# Patient Record
Sex: MALE | Race: WHITE | NOT HISPANIC OR LATINO | Employment: OTHER | URBAN - METROPOLITAN AREA
[De-identification: names, ages, dates, MRNs, and addresses within clinical notes are randomized per-mention and may not be internally consistent; named-entity substitution may affect disease eponyms.]

---

## 2017-06-29 ENCOUNTER — TELEPHONE (OUTPATIENT)
Dept: DERMATOLOGY | Facility: CLINIC | Age: 72
End: 2017-06-29

## 2017-06-29 DIAGNOSIS — L71.9 ACNE ROSACEA: Primary | ICD-10-CM

## 2017-06-29 RX ORDER — MINOCYCLINE HYDROCHLORIDE 100 MG/1
CAPSULE ORAL
Qty: 60 CAPSULE | Refills: 1 | Status: SHIPPED | OUTPATIENT
Start: 2017-06-29 | End: 2017-11-06

## 2017-06-29 NOTE — TELEPHONE ENCOUNTER
Looks like he reported history of Rosacea at 9-27-18 Derm office visit, but I don't see that we have ordered Minocycline for him before. It is a patient reported medication per medication list review.     He was seen for follow up 12-20-16, and to return to clinic prn.    Please advise. (No pharmacy noted by Station )     Sofia Alberts RN

## 2017-06-29 NOTE — TELEPHONE ENCOUNTER
Order sent to pharmacy. Please notify patient.     Please have him f/u if rosacea is flaring despite the MCN

## 2017-06-29 NOTE — TELEPHONE ENCOUNTER
Patient notified of Minocycline order. Patient verbalized understanding. Advised if not improving, to schedule follow up Rosacea appointment and is due in Mid December for a recheck appointment if he wants to continue any medications we have prescribed for him. Sofia Alberts RN

## 2017-06-29 NOTE — TELEPHONE ENCOUNTER
Pt calling to get a refill on minocycline 50 MG tab.    Please advise -    Liseth Pitt  Clinic Station

## 2017-08-25 ENCOUNTER — TELEPHONE (OUTPATIENT)
Dept: DERMATOLOGY | Facility: CLINIC | Age: 72
End: 2017-08-25

## 2017-08-25 DIAGNOSIS — L57.0 AK (ACTINIC KERATOSIS): Primary | ICD-10-CM

## 2017-08-25 DIAGNOSIS — L71.9 ROSACEA: ICD-10-CM

## 2017-08-25 NOTE — TELEPHONE ENCOUNTER
Pt calling stating he would like Dr. mcclure to  Refill his medications:  Metronidazole gel .75%  Fluorouracil cream 5 %    First light in [place] pharmacy       Cyndy Armendariz  Specialty CSS

## 2017-08-28 RX ORDER — METRONIDAZOLE 10 MG/G
GEL TOPICAL DAILY
Qty: 60 G | Refills: 11 | Status: SHIPPED | OUTPATIENT
Start: 2017-08-28 | End: 2022-07-19

## 2017-08-28 RX ORDER — FLUOROURACIL 50 MG/G
CREAM TOPICAL
Qty: 40 G | Refills: 3 | Status: SHIPPED | OUTPATIENT
Start: 2017-08-28 | End: 2022-07-19

## 2017-09-06 ENCOUNTER — TELEPHONE (OUTPATIENT)
Dept: DERMATOLOGY | Facility: CLINIC | Age: 72
End: 2017-09-06

## 2017-09-06 NOTE — TELEPHONE ENCOUNTER
Spoke to patient who got his last rx's from a different Derm Provider. I did explain he is to use the Efudex cream twice daily for two weeks to treat any pre cancerous areas and then to be seen in 3 months for a recheck appt to be sure areas treated have resolved. Patient stated his prior Provider ordered Efudex twice a week. I advised Dr Tucker always orders it be used twice a day for 2 weeks.     Patient stated his prior Provider ordered Metrogel for him twice a day, I did advise Dr Tucker recommended once daily and if he uses it once daily and not resolving issue, to call us.     Patient has a follow up appt scheduled in December 2017.     Sofia Alberts RN

## 2017-09-06 NOTE — TELEPHONE ENCOUNTER
Reason for Call:  Patient is calling in to clarify new sigs on medications sent to pharmacy    Detailed comments: see above    Phone Number Patient can be reached at: Home number on file [phone] (home)    Best Time: any    Can we leave a detailed message on this number? YES    Call taken on 9/6/2017 at 1:06 PM by Natasha Ho

## 2017-11-06 DIAGNOSIS — L71.9 ACNE ROSACEA: ICD-10-CM

## 2017-11-07 RX ORDER — MINOCYCLINE HYDROCHLORIDE 100 MG/1
CAPSULE ORAL
Qty: 60 CAPSULE | Refills: 0 | Status: SHIPPED | OUTPATIENT
Start: 2017-11-07 | End: 2018-05-08

## 2017-12-12 ENCOUNTER — OFFICE VISIT (OUTPATIENT)
Dept: DERMATOLOGY | Facility: CLINIC | Age: 72
End: 2017-12-12
Payer: COMMERCIAL

## 2017-12-12 VITALS — SYSTOLIC BLOOD PRESSURE: 160 MMHG | HEART RATE: 46 BPM | DIASTOLIC BLOOD PRESSURE: 90 MMHG | OXYGEN SATURATION: 98 %

## 2017-12-12 DIAGNOSIS — Z87.2 HISTORY OF ACTINIC KERATOSES: Primary | ICD-10-CM

## 2017-12-12 DIAGNOSIS — L73.9 FOLLICULITIS: ICD-10-CM

## 2017-12-12 PROCEDURE — 99213 OFFICE O/P EST LOW 20 MIN: CPT | Performed by: DERMATOLOGY

## 2017-12-12 NOTE — PROGRESS NOTES
Jean Hernandez is a 72 year old year old male patient here today for f/u efudex has done very well.  No issues.  He notes that his neck is clear bu mcn upsets hi stomach.  Associated symptoms: none.  Patient has no other skin complaints today.  Remainder of the HPI, Meds, PMH, Allergies, FH, and SH was reviewed in chart.    History reviewed. No pertinent past medical history.    History reviewed. No pertinent surgical history.     Family History   Problem Relation Age of Onset     Melanoma No family hx of        Social History     Social History     Marital status:      Spouse name: N/A     Number of children: N/A     Years of education: N/A     Occupational History     Not on file.     Social History Main Topics     Smoking status: Never Smoker     Smokeless tobacco: Never Used     Alcohol use Not on file     Drug use: Not on file     Sexual activity: Not on file     Other Topics Concern     Not on file     Social History Narrative       Outpatient Encounter Prescriptions as of 12/12/2017   Medication Sig Dispense Refill     minocycline (MINOCIN/DYNACIN) 100 MG capsule TAKE 1 CAPSULE BY MOUTH TWICE DAILY 60 capsule 0     fluorouracil (EFUDEX) 5 % cream Apply twice daily for two weeks to face 40 g 3     brimonidine (ALPHAGAN) 0.2 % ophthalmic solution instill 1 drop IN EACH EYE TWICE DAILY  5     timolol (TIMOPTIC) 0.5 % ophthalmic solution instill 1 drop IN EACH EYE TWICE DAILY  5     latanoprost (XALATAN) 0.005 % ophthalmic solution instill 1 drop into IN EACH EYE EVERY NIGHT AT BEDTIME  5     metroNIDAZOLE (METROGEL) 1 % gel Apply topically daily (Patient not taking: Reported on 12/12/2017) 60 g 11     minocycline (MINOCIN,DYNACIN) 50 MG capsule TAKE 2 CAPSULES (100mg) BY MOUTH TWICE DAILY  1     fluocin-hydroquinone-tretinoin 0.01-4-0.05 % CREA Apply at bedtime (Patient not taking: Reported on 12/12/2017) 60 g 11     No facility-administered encounter medications on file as of 12/12/2017.              Review  Of Systems  Skin: As above  Eyes: negative  Ears/Nose/Throat: negative  Respiratory: No shortness of breath, dyspnea on exertion, cough, or hemoptysis  Cardiovascular: negative  Gastrointestinal: negative  Genitourinary: negative  Musculoskeletal: negative  Neurologic: negative  Psychiatric: negative  Hematologic/Lymphatic/Immunologic: negative  Endocrine: negative      O:   NAD, WDWN, Alert & Oriented, Mood & Affect wnl, Vitals stable   Here today alone   /90  Pulse (!) 46  SpO2 98%   General appearance normal   Vitals stable   Alert, oriented and in no acute distress     Face clear from actinic keratosis    No inflammatory papules onneck       The remainder of expanded problem focused exam was unremarkable; the following areas were examined:  scalp/hair, conjunctiva/lids, face, neck, lips, chest, digits/nails, RUE, LUE.      Eyes: Conjunctivae/lids:Normal     ENT: Lips, buccal mucosa, tongue: normal    MSK:Normal    Cardiovascular: peripheral edema none    Pulm: Breathing Normal    Neuro/Psych: Orientation:Normal; Mood/Affect:Normal      A/P:  1. Hx of actinic keratosis s/p efudex has done great!  2. Folliculitis clear  topiclas discussed with patient   Low dose doxy discussed with patient he declines  Cont topiclas prn  BENIGN LESIONS DISCUSSED WITH PATIENT:  I discussed the specifics of tumor, prognosis, and genetics of benign lesions.  I explained that treatment of these lesions would be purely cosmetic and not medically neccessary.  I discussed with patient different removal options including excision, cautery and /or laser.      Nature and genetics of benign skin lesions dicussed with patient.  Signs and Symptoms of skin cancer discussed with patient.  Patient encouraged to perform monthly skin exams.  UV precautions reviewed with patient.  Skin care regimen reviewed with patient: Eliminate harsh soaps, i.e. Dial, zest, irsih spring; Mild soaps such as Cetaphil or Dove sensitive skin, avoid hot or  cold showers, aggressive use of emollients including vanicream, cetaphil or cerave discussed with patient.    Return to clinic 6 months

## 2017-12-12 NOTE — LETTER
12/12/2017         RE: Jean Hernandez  [address] [address] [address]        Dear Colleague,    Thank you for referring your patient, Jean Hernandez, to the [facility]. Please see a copy of my visit note below.    Jean Hernandez is a 72 year old year old male patient here today for f/u efudex has done very well.  No issues.  He notes that his neck is clear bu mcn upsets hi stomach.  Associated symptoms: none.  Patient has no other skin complaints today.  Remainder of the HPI, Meds, PMH, Allergies, FH, and SH was reviewed in chart.    History reviewed. No pertinent past medical history.    History reviewed. No pertinent surgical history.     Family History   Problem Relation Age of Onset     Melanoma No family hx of        Social History     Social History     Marital status:      Spouse name: N/A     Number of children: N/A     Years of education: N/A     Occupational History     Not on file.     Social History Main Topics     Smoking status: Never Smoker     Smokeless tobacco: Never Used     Alcohol use Not on file     Drug use: Not on file     Sexual activity: Not on file     Other Topics Concern     Not on file     Social History Narrative       Outpatient Encounter Prescriptions as of 12/12/2017   Medication Sig Dispense Refill     minocycline (MINOCIN/DYNACIN) 100 MG capsule TAKE 1 CAPSULE BY MOUTH TWICE DAILY 60 capsule 0     fluorouracil (EFUDEX) 5 % cream Apply twice daily for two weeks to face 40 g 3     brimonidine (ALPHAGAN) 0.2 % ophthalmic solution instill 1 drop IN EACH EYE TWICE DAILY  5     timolol (TIMOPTIC) 0.5 % ophthalmic solution instill 1 drop IN EACH EYE TWICE DAILY  5     latanoprost (XALATAN) 0.005 % ophthalmic solution instill 1 drop into IN EACH EYE EVERY NIGHT AT BEDTIME  5     metroNIDAZOLE (METROGEL) 1 % gel Apply topically daily (Patient not taking: Reported on 12/12/2017) 60 g 11     minocycline (MINOCIN,DYNACIN) 50 MG capsule TAKE 2 CAPSULES (100mg) BY MOUTH  TWICE DAILY  1     fluocin-hydroquinone-tretinoin 0.01-4-0.05 % CREA Apply at bedtime (Patient not taking: Reported on 12/12/2017) 60 g 11     No facility-administered encounter medications on file as of 12/12/2017.              Review Of Systems  Skin: As above  Eyes: negative  Ears/Nose/Throat: negative  Respiratory: No shortness of breath, dyspnea on exertion, cough, or hemoptysis  Cardiovascular: negative  Gastrointestinal: negative  Genitourinary: negative  Musculoskeletal: negative  Neurologic: negative  Psychiatric: negative  Hematologic/Lymphatic/Immunologic: negative  Endocrine: negative      O:   NAD, WDWN, Alert & Oriented, Mood & Affect wnl, Vitals stable   Here today alone   /90  Pulse (!) 46  SpO2 98%   General appearance normal   Vitals stable   Alert, oriented and in no acute distress     Face clear from actinic keratosis    No inflammatory papules onneck       The remainder of expanded problem focused exam was unremarkable; the following areas were examined:  scalp/hair, conjunctiva/lids, face, neck, lips, chest, digits/nails, RUE, LUE.      Eyes: Conjunctivae/lids:Normal     ENT: Lips, buccal mucosa, tongue: normal    MSK:Normal    Cardiovascular: peripheral edema none    Pulm: Breathing Normal    Neuro/Psych: Orientation:Normal; Mood/Affect:Normal      A/P:  1. Hx of actinic keratosis s/p efudex has done great!  2. Folliculitis clear  topiclas discussed with patient   Low dose doxy discussed with patient he declines  Cont topiclas prn  BENIGN LESIONS DISCUSSED WITH PATIENT:  I discussed the specifics of tumor, prognosis, and genetics of benign lesions.  I explained that treatment of these lesions would be purely cosmetic and not medically neccessary.  I discussed with patient different removal options including excision, cautery and /or laser.      Nature and genetics of benign skin lesions dicussed with patient.  Signs and Symptoms of skin cancer discussed with patient.  Patient  encouraged to perform monthly skin exams.  UV precautions reviewed with patient.  Skin care regimen reviewed with patient: Eliminate harsh soaps, i.e. Dial, zest, irsih spring; Mild soaps such as Cetaphil or Dove sensitive skin, avoid hot or cold showers, aggressive use of emollients including vanicream, cetaphil or cerave discussed with patient.    Return to clinic 6 months      Again, thank you for allowing me to participate in the care of your patient.        Sincerely,        Balta Tucker MD

## 2017-12-12 NOTE — NURSING NOTE
Chief Complaint   Patient presents with     Derm Problem     med fu       Vitals:    12/12/17 1005   BP: 160/90   Pulse: (!) 46   SpO2: 98%     Wt Readings from Last 1 Encounters:   No data found for Wt   Heydi Pina LPN.................12/12/2017

## 2017-12-12 NOTE — MR AVS SNAPSHOT
"              After Visit Summary   12/12/2017    Jean Hernandez    MRN: [id number]           Patient Information     Date Of Birth          1945        Visit Information        Provider Department      12/12/2017 10:00 AM Balta Tucker MD [facility]        Today's Diagnoses     History of actinic keratoses    -  1    Folliculitis           Follow-ups after your visit        Your next 10 appointments already scheduled     Jun 12, 2018 10:00 AM CDT   [facility] with Balta Tucker MD   [facility]    [address] [place] [address]   [phone]              Who to contact     If you have questions or need follow up information about today's clinic visit or your schedule please contact [facility] directly at [phone].  Normal or non-critical lab and imaging results will be communicated to you by MyChart, letter or phone within 4 business days after the clinic has received the results. If you do not hear from us within 7 days, please contact the clinic through MyChart or phone. If you have a critical or abnormal lab result, we will notify you by phone as soon as possible.  Submit refill requests through [email or web address] or call your pharmacy and they will forward the refill request to us. Please allow 3 business days for your refill to be completed.          Additional Information About Your Visit        MyChart Information     [email or web address] lets you send messages to your doctor, view your test results, renew your prescriptions, schedule appointments and more. To sign up, go to [email or web address] . Click on \"Log in\" on the left side of the screen, which will take you to the Welcome page. Then click on \"Sign up Now\" on the right side of the page.     You will be asked to enter the access code listed below, as well as some personal information. Please follow the directions to create your username and password.   "   Your access code is: [email or web address]  Expires: 3/12/2018 10:20 AM     Your access code will  in 90 days. If you need help or a new code, please call your [place] clinic or [phone].        Care EveryWhere ID     This is your Care EveryWhere ID. This could be used by other organizations to access your [place] medical records  [id number]        Your Vitals Were     Pulse Pulse Oximetry                46 98%           Blood Pressure from Last 3 Encounters:   17 160/90   16 157/89   16 (!) 165/91    Weight from Last 3 Encounters:   No data found for Wt              Today, you had the following     No orders found for display       Primary Care Provider Fax #    Physician No Ref-Primary [phone]       No address on file        Equal Access to Services     [facility] : Hadii camron Randall, waaxda luqadaha, qaybta kaalmada adeyoniyastacey, ras pyaton . So [facility] [phone].    ATENCIÓN: Si habla español, tiene a deleon disposición servicios gratuitos de asistencia lingüística. Gustavo al [phone].    We comply with applicable federal civil rights laws and [place] laws. We do not discriminate on the basis of race, color, national origin, age, disability, sex, sexual orientation, or gender identity.            Thank you!     Thank you for choosing [facility]  for your care. Our goal is always to provide you with excellent care. Hearing back from our patients is one way we can continue to improve our services. Please take a few minutes to complete the written survey that you may receive in the mail after your visit with us. Thank you!             Your Updated Medication List - Protect others around you: Learn how to safely use, store and throw away your medicines at www.disposemymeds.org.          This list is accurate as of: 17 10:20 AM.  Always use your most recent med list.                   Brand Name Dispense Instructions for use  Diagnosis    brimonidine 0.2 % ophthalmic solution    ALPHAGAN     instill 1 drop IN EACH EYE TWICE DAILY        fluocin-hydroquinone-tretinoin 0.01-4-0.05 % Crea     60 g    Apply at bedtime    Melasma       fluorouracil 5 % cream    EFUDEX    40 g    Apply twice daily for two weeks to face    AK (actinic keratosis), Rosacea       latanoprost 0.005 % ophthalmic solution    XALATAN     instill 1 drop into IN EACH EYE EVERY NIGHT AT BEDTIME        metroNIDAZOLE 1 % gel    METROGEL    60 g    Apply topically daily    AK (actinic keratosis), Rosacea       * minocycline 50 MG capsule    MINOCIN/DYNACIN     TAKE 2 CAPSULES (100mg) BY MOUTH TWICE DAILY        * minocycline 100 MG capsule    MINOCIN/DYNACIN    60 capsule    TAKE 1 CAPSULE BY MOUTH TWICE DAILY    Acne rosacea       timolol 0.5 % ophthalmic solution    TIMOPTIC     instill 1 drop IN EACH EYE TWICE DAILY        * Notice:  This list has 2 medication(s) that are the same as other medications prescribed for you. Read the directions carefully, and ask your doctor or other care provider to review them with you.

## 2018-01-31 DIAGNOSIS — L71.9 ROSACEA: Primary | ICD-10-CM

## 2018-01-31 RX ORDER — MINOCYCLINE HYDROCHLORIDE 50 MG/1
CAPSULE ORAL
Qty: 60 CAPSULE | Refills: 1 | Status: SHIPPED | OUTPATIENT
Start: 2018-01-31 | End: 2018-04-04

## 2018-01-31 NOTE — TELEPHONE ENCOUNTER
minocycline (MINOCIN/DYNACIN) 100 MG capsule    Date Last Filled: 11/07/2017  QTY: 60    [facility] [place]

## 2018-01-31 NOTE — TELEPHONE ENCOUNTER
Spoke to pt and he stated that it was discussed at his last office visit that the Minocycline would be decreased to 50 mg instead of the 100 mg. Unable to find documentation or dose ordered. Please review and order 50 mg minocycline for pt if appropriate. [facility] pharmacy in [place].  Ileana BEE RN BSN PHN  Specialty Clinics

## 2018-04-04 DIAGNOSIS — L71.9 ROSACEA: ICD-10-CM

## 2018-04-04 RX ORDER — MINOCYCLINE HYDROCHLORIDE 50 MG/1
CAPSULE ORAL
Qty: 60 CAPSULE | Refills: 1 | Status: SHIPPED | OUTPATIENT
Start: 2018-04-04 | End: 2018-05-04

## 2018-04-04 NOTE — TELEPHONE ENCOUNTER
Do you want patient to go on this Rx? Patient declined during last office visit.  JOSE ALEJANDRO Amador-BSN  [facility]  [phone]

## 2018-05-04 DIAGNOSIS — L71.9 ROSACEA: ICD-10-CM

## 2018-05-04 NOTE — TELEPHONE ENCOUNTER
minocycline (MINOCIN/DYNACIN) 50 MG capsule    Date Last Filled: 04/18/18  QTY: 120    [facility] [place]

## 2018-05-08 RX ORDER — MINOCYCLINE HYDROCHLORIDE 50 MG/1
CAPSULE ORAL
Qty: 60 CAPSULE | Refills: 0 | Status: SHIPPED | OUTPATIENT
Start: 2018-05-08 | End: 2018-09-17

## 2018-06-12 ENCOUNTER — OFFICE VISIT (OUTPATIENT)
Dept: DERMATOLOGY | Facility: CLINIC | Age: 73
End: 2018-06-12
Payer: COMMERCIAL

## 2018-06-12 VITALS — SYSTOLIC BLOOD PRESSURE: 156 MMHG | DIASTOLIC BLOOD PRESSURE: 89 MMHG | TEMPERATURE: 97 F | HEART RATE: 56 BPM

## 2018-06-12 DIAGNOSIS — L82.1 SK (SEBORRHEIC KERATOSIS): ICD-10-CM

## 2018-06-12 DIAGNOSIS — L81.4 LENTIGO: Primary | ICD-10-CM

## 2018-06-12 PROCEDURE — 99213 OFFICE O/P EST LOW 20 MIN: CPT | Performed by: DERMATOLOGY

## 2018-06-12 NOTE — LETTER
6/12/2018         RE: Jean Hernandez  [address] [address] [address]        Dear Colleague,    Thank you for referring your patient, Jean Hernandez, to the [facility]. Please see a copy of my visit note below.    Jean Hernandez is a 72 year old year old male patient here today for hx of actinic keratosis and folliculitis.  All clear,  Stopped MCN,  No issues.  Has used efudex in the past.  He notes one brown spot on right arm.   .  Patient states this has been present for a whi;le.  Patient reports the following symptoms:  none .  Patient reports the following previous treatments none.  Patient reports the following modifying factors none.  Associated symptoms: none.  Patient has no other skin complaints today.  Remainder of the HPI, Meds, PMH, Allergies, FH, and SH was reviewed in chart.    History reviewed. No pertinent past medical history.    History reviewed. No pertinent surgical history.     Family History   Problem Relation Age of Onset     Melanoma No family hx of        Social History     Social History     Marital status:      Spouse name: N/A     Number of children: N/A     Years of education: N/A     Occupational History     Not on file.     Social History Main Topics     Smoking status: Never Smoker     Smokeless tobacco: Never Used     Alcohol use Not on file     Drug use: Not on file     Sexual activity: Not on file     Other Topics Concern     Not on file     Social History Narrative       Outpatient Encounter Prescriptions as of 6/12/2018   Medication Sig Dispense Refill     brimonidine (ALPHAGAN) 0.2 % ophthalmic solution instill 1 drop IN EACH EYE TWICE DAILY  5     fluocin-hydroquinone-tretinoin 0.01-4-0.05 % CREA Apply at bedtime (Patient not taking: Reported on 12/12/2017) 60 g 11     fluorouracil (EFUDEX) 5 % cream Apply twice daily for two weeks to face (Patient not taking: Reported on 6/12/2018) 40 g 3     latanoprost (XALATAN) 0.005 % ophthalmic solution instill 1  drop into IN EACH EYE EVERY NIGHT AT BEDTIME  5     metroNIDAZOLE (METROGEL) 1 % gel Apply topically daily (Patient not taking: Reported on 12/12/2017) 60 g 11     minocycline (MINOCIN/DYNACIN) 50 MG capsule TAKE 2 CAPSULES (100mg) BY MOUTH TWICE DAILY 60 capsule 0     timolol (TIMOPTIC) 0.5 % ophthalmic solution instill 1 drop IN EACH EYE TWICE DAILY  5     No facility-administered encounter medications on file as of 6/12/2018.              Review Of Systems  Skin: As above  Eyes: negative  Ears/Nose/Throat: negative  Respiratory: No shortness of breath, dyspnea on exertion, cough, or hemoptysis  Cardiovascular: negative  Gastrointestinal: negative  Genitourinary: negative  Musculoskeletal: negative  Neurologic: negative  Psychiatric: negative  Hematologic/Lymphatic/Immunologic: negative  Endocrine: negative      O:   NAD, WDWN, Alert & Oriented, Mood & Affect wnl, Vitals stable   Here today alone   /89  Pulse 56  Temp 97  F (36.1  C) (Tympanic)   General appearance normal   Vitals stable   Alert, oriented and in no acute distress      Following lymph nodes palpated: Occipital, Cervical, Supraclavicular no lad   Stuck on papules and brown macules on trunk and ext    R arm stuck on appule         The remainder of expanded problem focused exam was unremarkable; the following areas were examined:  scalp/hair, conjunctiva/lids, face, neck, lips, chest, digits/nails, RUE, LUE.      Eyes: Conjunctivae/lids:Normal     ENT: Lips, buccal mucosa, tongue: normal    MSK:Normal    Cardiovascular: peripheral edema none    Pulm: Breathing Normal    Lymph Nodes: No Head and Neck Lymphadenopathy     Neuro/Psych: Orientation:Normal; Mood/Affect:Normal      A/P:  1. Folliculitis clear  2. Actinic keratosis clear  3. Seborrheic keratosis, letnigo  Stop mcn  BENIGN LESIONS DISCUSSED WITH PATIENT:  I discussed the specifics of tumor, prognosis, and genetics of benign lesions.  I explained that treatment of these lesions would be  purely cosmetic and not medically neccessary.  I discussed with patient different removal options including excision, cautery and /or laser.      Nature and genetics of benign skin lesions dicussed with patient.  Signs and Symptoms of skin cancer discussed with patient.  Patient encouraged to perform monthly skin exams.  UV precautions reviewed with patient.  Patient to follow up with Primary Care provider regarding elevated blood pressure.  Skin care regimen reviewed with patient: Eliminate harsh soaps, i.e. Dial, zest, irsih spring; Mild soaps such as Cetaphil or Dove sensitive skin, avoid hot or cold showers, aggressive use of emollients including vanicream, cetaphil or cerave discussed with patient.    Risks of non-melanoma skin cancer discussed with patient   Return to clinic 12 months      Again, thank you for allowing me to participate in the care of your patient.        Sincerely,        Balta Tucker MD

## 2018-06-12 NOTE — PROGRESS NOTES
Jean Hernandez is a 72 year old year old male patient here today for hx of actinic keratosis and folliculitis.  All clear,  Stopped MCN,  No issues.  Has used efudex in the past.  He notes one brown spot on right arm.   .  Patient states this has been present for a whi;le.  Patient reports the following symptoms:  none .  Patient reports the following previous treatments none.  Patient reports the following modifying factors none.  Associated symptoms: none.  Patient has no other skin complaints today.  Remainder of the HPI, Meds, PMH, Allergies, FH, and SH was reviewed in chart.    History reviewed. No pertinent past medical history.    History reviewed. No pertinent surgical history.     Family History   Problem Relation Age of Onset     Melanoma No family hx of        Social History     Social History     Marital status:      Spouse name: N/A     Number of children: N/A     Years of education: N/A     Occupational History     Not on file.     Social History Main Topics     Smoking status: Never Smoker     Smokeless tobacco: Never Used     Alcohol use Not on file     Drug use: Not on file     Sexual activity: Not on file     Other Topics Concern     Not on file     Social History Narrative       Outpatient Encounter Prescriptions as of 6/12/2018   Medication Sig Dispense Refill     brimonidine (ALPHAGAN) 0.2 % ophthalmic solution instill 1 drop IN EACH EYE TWICE DAILY  5     fluocin-hydroquinone-tretinoin 0.01-4-0.05 % CREA Apply at bedtime (Patient not taking: Reported on 12/12/2017) 60 g 11     fluorouracil (EFUDEX) 5 % cream Apply twice daily for two weeks to face (Patient not taking: Reported on 6/12/2018) 40 g 3     latanoprost (XALATAN) 0.005 % ophthalmic solution instill 1 drop into IN EACH EYE EVERY NIGHT AT BEDTIME  5     metroNIDAZOLE (METROGEL) 1 % gel Apply topically daily (Patient not taking: Reported on 12/12/2017) 60 g 11     minocycline (MINOCIN/DYNACIN) 50 MG capsule TAKE 2 CAPSULES (100mg) BY  MOUTH TWICE DAILY 60 capsule 0     timolol (TIMOPTIC) 0.5 % ophthalmic solution instill 1 drop IN EACH EYE TWICE DAILY  5     No facility-administered encounter medications on file as of 6/12/2018.              Review Of Systems  Skin: As above  Eyes: negative  Ears/Nose/Throat: negative  Respiratory: No shortness of breath, dyspnea on exertion, cough, or hemoptysis  Cardiovascular: negative  Gastrointestinal: negative  Genitourinary: negative  Musculoskeletal: negative  Neurologic: negative  Psychiatric: negative  Hematologic/Lymphatic/Immunologic: negative  Endocrine: negative      O:   NAD, WDWN, Alert & Oriented, Mood & Affect wnl, Vitals stable   Here today alone   /89  Pulse 56  Temp 97  F (36.1  C) (Tympanic)   General appearance normal   Vitals stable   Alert, oriented and in no acute distress      Following lymph nodes palpated: Occipital, Cervical, Supraclavicular no lad   Stuck on papules and brown macules on trunk and ext    R arm stuck on appule         The remainder of expanded problem focused exam was unremarkable; the following areas were examined:  scalp/hair, conjunctiva/lids, face, neck, lips, chest, digits/nails, RUE, LUE.      Eyes: Conjunctivae/lids:Normal     ENT: Lips, buccal mucosa, tongue: normal    MSK:Normal    Cardiovascular: peripheral edema none    Pulm: Breathing Normal    Lymph Nodes: No Head and Neck Lymphadenopathy     Neuro/Psych: Orientation:Normal; Mood/Affect:Normal      A/P:  1. Folliculitis clear  2. Actinic keratosis clear  3. Seborrheic keratosis, letnigo  Stop mcn  BENIGN LESIONS DISCUSSED WITH PATIENT:  I discussed the specifics of tumor, prognosis, and genetics of benign lesions.  I explained that treatment of these lesions would be purely cosmetic and not medically neccessary.  I discussed with patient different removal options including excision, cautery and /or laser.      Nature and genetics of benign skin lesions dicussed with patient.  Signs and Symptoms of  skin cancer discussed with patient.  Patient encouraged to perform monthly skin exams.  UV precautions reviewed with patient.  Patient to follow up with Primary Care provider regarding elevated blood pressure.  Skin care regimen reviewed with patient: Eliminate harsh soaps, i.e. Dial, zest, irsih spring; Mild soaps such as Cetaphil or Dove sensitive skin, avoid hot or cold showers, aggressive use of emollients including vanicream, cetaphil or cerave discussed with patient.    Risks of non-melanoma skin cancer discussed with patient   Return to clinic 12 months

## 2018-06-12 NOTE — NURSING NOTE
Initial /89  Pulse 56  Temp 97  F (36.1  C) (Tympanic) There is no height or weight on file to calculate BMI. .    Venus Diop LPN

## 2018-06-12 NOTE — MR AVS SNAPSHOT
After Visit Summary   6/12/2018    Jean Hernandez    MRN: [id number]           Patient Information     Date Of Birth          1945        Visit Information        Provider Department      6/12/2018 10:00 AM Balta Tucker MD [facility]        Today's Diagnoses     Lentigo    -  1    SK (seborrheic keratosis)           Follow-ups after your visit        Who to contact     If you have questions or need follow up information about today's clinic visit or your schedule please contact [facility] directly at [phone].  Normal or non-critical lab and imaging results will be communicated to you by MyChart, letter or phone within 4 business days after the clinic has received the results. If you do not hear from us within 7 days, please contact the clinic through MyChart or phone. If you have a critical or abnormal lab result, we will notify you by phone as soon as possible.  Submit refill requests through [employer] or call your pharmacy and they will forward the refill request to us. Please allow 3 business days for your refill to be completed.          Additional Information About Your Visit        Care EveryWhere ID     This is your Care EveryWhere ID. This could be used by other organizations to access your [place] medical records  [id number]        Your Vitals Were     Pulse Temperature                56 97  F (36.1  C) (Tympanic)           Blood Pressure from Last 3 Encounters:   06/12/18 156/89   12/12/17 160/90   12/20/16 157/89    Weight from Last 3 Encounters:   No data found for Wt              Today, you had the following     No orders found for display       Primary Care Provider Fax #    Physician No Ref-Primary [phone]       No address on file        Equal Access to Services     JUNIOR LEÓN : Evan Randall, alessandro flores, ras george . So [facility] [phone].    ATENCIÓN: Si  bryant vera, tiene a deleon disposición servicios gratuitos de asistencia lingüística. Gustavo tate [phone].    We comply with applicable federal civil rights laws and [place] laws. We do not discriminate on the basis of race, color, national origin, age, disability, sex, sexual orientation, or gender identity.            Thank you!     Thank you for choosing [facility]  for your care. Our goal is always to provide you with excellent care. Hearing back from our patients is one way we can continue to improve our services. Please take a few minutes to complete the written survey that you may receive in the mail after your visit with us. Thank you!             Your Updated Medication List - Protect others around you: Learn how to safely use, store and throw away your medicines at www.disposemymeds.org.          This list is accurate as of 6/12/18 10:15 AM.  Always use your most recent med list.                   Brand Name Dispense Instructions for use Diagnosis    brimonidine 0.2 % ophthalmic solution    ALPHAGAN     instill 1 drop IN EACH EYE TWICE DAILY        fluocin-hydroquinone-tretinoin 0.01-4-0.05 % Crea     60 g    Apply at bedtime    Melasma       fluorouracil 5 % cream    EFUDEX    40 g    Apply twice daily for two weeks to face    AK (actinic keratosis), Rosacea       latanoprost 0.005 % ophthalmic solution    XALATAN     instill 1 drop into IN EACH EYE EVERY NIGHT AT BEDTIME        metroNIDAZOLE 1 % gel    METROGEL    60 g    Apply topically daily    AK (actinic keratosis), Rosacea       minocycline 50 MG capsule    MINOCIN/DYNACIN    60 capsule    TAKE 2 CAPSULES (100mg) BY MOUTH TWICE DAILY    Rosacea       timolol 0.5 % ophthalmic solution    TIMOPTIC     instill 1 drop IN EACH EYE TWICE DAILY

## 2018-09-17 DIAGNOSIS — L71.9 ROSACEA: ICD-10-CM

## 2018-09-17 NOTE — TELEPHONE ENCOUNTER
Reason for Call:  Medication or medication refill:    Do you use a [facility]?  Name of the pharmacy and phone number for the current request:  [facility]    Name of the medication requested: Minocycline    Other request: Pt is requesting 100 mg strength (pt had to take 4 a day at the 50 mg dose, and found that too hard to do)    Can we leave a detailed message on this number? YES    Phone number patient can be reached at: Home number on file [phone] (home)    Best Time: Any    Call taken on 9/17/2018 at 10:09 AM by Denise Behrendt

## 2018-09-18 RX ORDER — MINOCYCLINE HYDROCHLORIDE 100 MG/1
100 CAPSULE ORAL 2 TIMES DAILY WITH MEALS
Qty: 28 CAPSULE | Refills: 3 | Status: SHIPPED | OUTPATIENT
Start: 2018-09-18 | End: 2018-12-06

## 2018-09-18 NOTE — TELEPHONE ENCOUNTER
"Spoke to patient who stated:     \"I had stopped the Minocycline like he said but now my skin is getting bumpy again, so I would like to be able to take it if I need it, but it is too hard to take 4 pills a day...\"     Please advise. I cued sig with new directions- please check if appropriate before signing... Sofia Alberts RN    "

## 2018-12-06 DIAGNOSIS — L71.9 ROSACEA: ICD-10-CM

## 2018-12-06 RX ORDER — MINOCYCLINE HYDROCHLORIDE 100 MG/1
100 CAPSULE ORAL 2 TIMES DAILY WITH MEALS
Qty: 60 CAPSULE | Refills: 4 | Status: SHIPPED | OUTPATIENT
Start: 2018-12-06 | End: 2019-09-30

## 2018-12-06 NOTE — TELEPHONE ENCOUNTER
Reason for Call:  Medication or medication refill:    Do you use a [facility]?  Name of the pharmacy and phone number for the current request:  [facility]    Name of the medication requested: minocycline    Other request: LOV:  6/12/18  LAST REFILL:  11/19/18    Can we leave a detailed message on this number? YES    Phone number patient can be reached at: Home number on file [phone] (home)    Best Time: any     Call taken on 12/6/2018 at 10:58 AM by Cyndy Armendariz

## 2018-12-06 NOTE — TELEPHONE ENCOUNTER
Patient has follow-up appointment scheduled with provider  medication has been re-ordered per last encounter.   Will refill per [facility] protocol.     Karmen MADERA RN   [facility]

## 2018-12-07 ENCOUNTER — TELEPHONE (OUTPATIENT)
Dept: DERMATOLOGY | Facility: CLINIC | Age: 73
End: 2018-12-07

## 2018-12-07 NOTE — TELEPHONE ENCOUNTER
Spoke to pt and he stated that he had a bump on his ear and he used efudex on this bump in which the scab has fallen off and now is a smaller sore. He stated that he also has a scabby thing on his head. Advised pt that he should have an appointment to look at the spots before putting anymore medications on them.  Pt agreed with plan and appt was made.  Ileana BEE RN BSN PHN  Specialty Clinics

## 2018-12-07 NOTE — TELEPHONE ENCOUNTER
Reason for Call:  Other prescription    Detailed comments: pt calling stating he has bump on his ear that he has been using metroNIDAZOLE gel. The gel is  and also has a efudex that is . He would like to know if he should get a refill on these to have?    Phone Number Patient can be reached at: Home number on file [phone] (home)    Best Time: any     Can we leave a detailed message on this number? YES    Call taken on 2018 at 3:22 PM by Cyndy Armendariz

## 2018-12-11 ENCOUNTER — OFFICE VISIT (OUTPATIENT)
Dept: DERMATOLOGY | Facility: CLINIC | Age: 73
End: 2018-12-11
Payer: COMMERCIAL

## 2018-12-11 VITALS — SYSTOLIC BLOOD PRESSURE: 157 MMHG | HEART RATE: 57 BPM | DIASTOLIC BLOOD PRESSURE: 91 MMHG | HEIGHT: 74 IN

## 2018-12-11 DIAGNOSIS — L82.1 SEBORRHEIC KERATOSIS: ICD-10-CM

## 2018-12-11 DIAGNOSIS — L57.0 AK (ACTINIC KERATOSIS): Primary | ICD-10-CM

## 2018-12-11 DIAGNOSIS — L81.4 LENTIGO: ICD-10-CM

## 2018-12-11 PROCEDURE — 17000 DESTRUCT PREMALG LESION: CPT | Performed by: DERMATOLOGY

## 2018-12-11 PROCEDURE — 99213 OFFICE O/P EST LOW 20 MIN: CPT | Mod: 25 | Performed by: DERMATOLOGY

## 2018-12-11 NOTE — PATIENT INSTRUCTIONS
WOUND CARE INSTRUCTIONS   FOR CRYOSURGERY     Right ear  This area treated with liquid nitrogen should form a blister (areas treated may or may not blister-skin may just turn dark and slough off). You do not need to bandage the area unless a blister forms and breaks (which may be a few days). When the blister breaks, begin daily dressing changes as follows:  1) Clean and dry the area with tap water using clean Q-tip or sterile gauze pad.   2) Apply Polysporin ointment or Bacitracin ointment over entire wound. Do NOT use Neosporin ointment.   3) Cover the wound with a band-aid or sterile non-stick gauze pad and micropore paper tape.   REPEAT THESE INSTRUCTIONS AT LEAST ONCE A DAY UNTIL THE WOUND HAS COMPLETELY HEALED.   It is an old wives tale that a wound heals better when it is exposed to air and allowed to dry out. The wound will heal faster with a better cosmetic result if it is kept moist with ointment and covered with a bandage.   Do not let the wound dry out.   IMPORTANT INFORMATION ON REVERSE SIDE   Supplies Needed:   *Cotton tipped applicators (Q-tips)   *Polysporin ointment or Bacitracin ointment (NOT NEOSPORIN)   *Band-aids, or non stick gauze pads and micropore paper tape   PATIENT INFORMATION   During the healing process you will notice a number of changes. All wounds develop a small halo of redness surrounding the wound. This means healing is occurring. Severe itching with extensive redness usually indicates sensitivity to the ointment or bandage tape used to dress the wound. You should call our office if this develops.   Swelling and/or discoloration around your surgical site is common, particularly when performed around the eye.   All wounds normally drain. The larger the wound the more drainage there will be. After 7-10 days, you will notice the wound beginning to shrink and new skin will begin to grow. The wound is healed when you can see skin has formed over the entire area. A healed wound has a  healthy, shiny look to the surface and is red to dark pink in color to normalize. Wounds may take approximately 4-6 weeks to heal. Larger wounds may take 6-8 weeks. After the wound is healed you may discontinue dressing changes.   You may experience a sensation of tightness as your wound heals. This is normal and will gradually subside.   Your healed wound may be sensitive to temperature changes. This sensitivity improves with time, but if you re having a lot of discomfort, try to avoid temperature extremes.   Patients frequently experience itching after their wound appears to have healed because of the continue healing under the skin. Plain Vaseline will help relieve the itching.

## 2018-12-11 NOTE — LETTER
12/11/2018         RE: Jean Hernandez  [address] [address] [facility] [address]        Dear Colleague,    Thank you for referring your patient, Jean Hernandez, to the [facility]. Please see a copy of my visit note below.    Jean Hernandez is a 73 year old year old male patient here today for rough spot on scalp and right ear.   .  Patient states this has been present for a while.  Patient reports the following symptoms:  rough.  Patient reports the following previous treatments none.  Patient reports the following modifying factors none.  Associated symptoms: none.  Patient has no other skin complaints today.  Remainder of the HPI, Meds, PMH, Allergies, FH, and SH was reviewed in chart.      History reviewed. No pertinent past medical history.    History reviewed. No pertinent surgical history.     Family History   Problem Relation Age of Onset     Melanoma No family hx of        Social History     Socioeconomic History     Marital status:      Spouse name: Not on file     Number of children: Not on file     Years of education: Not on file     Highest education level: Not on file   Social Needs     Financial resource strain: Not on file     Food insecurity - worry: Not on file     Food insecurity - inability: Not on file     Transportation needs - medical: Not on file     Transportation needs - non-medical: Not on file   Occupational History     Not on file   Tobacco Use     Smoking status: Never Smoker     Smokeless tobacco: Never Used   Substance and Sexual Activity     Alcohol use: Not on file     Drug use: Not on file     Sexual activity: Not on file   Other Topics Concern     Not on file   Social History Narrative     Not on file       Outpatient Encounter Medications as of 12/11/2018   Medication Sig Dispense Refill     brimonidine (ALPHAGAN) 0.2 % ophthalmic solution instill 1 drop IN EACH EYE TWICE DAILY  5     fluorouracil (EFUDEX) 5 % cream Apply twice daily for two weeks to face 40 g 3      "latanoprost (XALATAN) 0.005 % ophthalmic solution instill 1 drop into IN EACH EYE EVERY NIGHT AT BEDTIME  5     minocycline (MINOCIN/DYNACIN) 100 MG capsule Take 1 capsule (100 mg) by mouth 2 times daily (with meals) 60 capsule 4     timolol (TIMOPTIC) 0.5 % ophthalmic solution instill 1 drop IN EACH EYE TWICE DAILY  5     fluocin-hydroquinone-tretinoin 0.01-4-0.05 % CREA Apply at bedtime (Patient not taking: Reported on 12/12/2017) 60 g 11     metroNIDAZOLE (METROGEL) 1 % gel Apply topically daily (Patient not taking: Reported on 12/12/2017) 60 g 11     No facility-administered encounter medications on file as of 12/11/2018.              Review Of Systems  Skin: As above  Eyes: negative  Ears/Nose/Throat: negative  Respiratory: No shortness of breath, dyspnea on exertion, cough, or hemoptysis  Cardiovascular: negative  Gastrointestinal: negative  Genitourinary: negative  Musculoskeletal: negative  Neurologic: negative  Psychiatric: negative  Hematologic/Lymphatic/Immunologic: negative  Endocrine: negative      O:   NAD, WDWN, Alert & Oriented, Mood & Affect wnl, Vitals stable   Here today alone   BP (!) 157/91   Pulse 57   Ht 1.867 m (6' 1.5\")    General appearance normal   Vitals stable   Alert, oriented and in no acute distress      Following lymph nodes palpated: Occipital, Cervical, Supraclavicular no lad   Stuck on papules and brown macules on trunk and ext    Scalp stuck on papule   R helix gritty papule     The remainder of expanded problem focused exam was unremarkable; the following areas were examined:  scalp/hair, conjunctiva/lids, face, neck, lips, chest, digits/nails, RUE, LUE.      Eyes: Conjunctivae/lids:Normal     ENT: Lips, buccal mucosa, tongue: normal    MSK:Normal    Cardiovascular: peripheral edema none    Pulm: Breathing Normal    Lymph Nodes: No Head and Neck Lymphadenopathy     Neuro/Psych: Orientation:Normal; Mood/Affect:Normal      A/P:  1. Seborrheic keratosis, lentigo,   2. R helix " actinic keratosis   LN2:  Treated with LN2 for 5s for 1-2 cycles. Warned risks of blistering, pain, pigment change, scarring, and incomplete resolution.  Advised patient to return if lesions do not completely resolve.  Wound care sheet given.    BENIGN LESIONS DISCUSSED WITH PATIENT:  I discussed the specifics of tumor, prognosis, and genetics of benign lesions.  I explained that treatment of these lesions would be purely cosmetic and not medically neccessary.  I discussed with patient different removal options including excision, cautery and /or laser.      Nature and genetics of benign skin lesions dicussed with patient.  Signs and Symptoms of skin cancer discussed with patient.  ABCDEs of melanoma reviewed with patient.  Patient encouraged to perform monthly skin exams.  UV precautions reviewed with patient.  Patient to follow up with Primary Care provider regarding elevated blood pressure.  Skin care regimen reviewed with patient: Eliminate harsh soaps, i.e. Dial, zest, irsih spring; Mild soaps such as Cetaphil or Dove sensitive skin, avoid hot or cold showers, aggressive use of emollients including vanicream, cetaphil or cerave discussed with patient.    Risks of non-melanoma skin cancer discussed with patient   Return to clinic 6 months      Again, thank you for allowing me to participate in the care of your patient.        Sincerely,        Balta Tucker MD

## 2018-12-11 NOTE — NURSING NOTE
"Chief Complaint   Patient presents with     Derm Problem       Vitals:    12/11/18 1302   BP: (!) 157/91   Pulse: 57   Height: 1.867 m (6' 1.5\")     Wt Readings from Last 1 Encounters:   No data found for Wt       Heydi Pina LPN.................12/11/2018    "

## 2018-12-11 NOTE — PROGRESS NOTES
Jean Hernandez is a 73 year old year old male patient here today for rough spot on scalp and right ear.   .  Patient states this has been present for a while.  Patient reports the following symptoms:  rough.  Patient reports the following previous treatments none.  Patient reports the following modifying factors none.  Associated symptoms: none.  Patient has no other skin complaints today.  Remainder of the HPI, Meds, PMH, Allergies, FH, and SH was reviewed in chart.      History reviewed. No pertinent past medical history.    History reviewed. No pertinent surgical history.     Family History   Problem Relation Age of Onset     Melanoma No family hx of        Social History     Socioeconomic History     Marital status:      Spouse name: Not on file     Number of children: Not on file     Years of education: Not on file     Highest education level: Not on file   Social Needs     Financial resource strain: Not on file     Food insecurity - worry: Not on file     Food insecurity - inability: Not on file     Transportation needs - medical: Not on file     Transportation needs - non-medical: Not on file   Occupational History     Not on file   Tobacco Use     Smoking status: Never Smoker     Smokeless tobacco: Never Used   Substance and Sexual Activity     Alcohol use: Not on file     Drug use: Not on file     Sexual activity: Not on file   Other Topics Concern     Not on file   Social History Narrative     Not on file       Outpatient Encounter Medications as of 12/11/2018   Medication Sig Dispense Refill     brimonidine (ALPHAGAN) 0.2 % ophthalmic solution instill 1 drop IN EACH EYE TWICE DAILY  5     fluorouracil (EFUDEX) 5 % cream Apply twice daily for two weeks to face 40 g 3     latanoprost (XALATAN) 0.005 % ophthalmic solution instill 1 drop into IN EACH EYE EVERY NIGHT AT BEDTIME  5     minocycline (MINOCIN/DYNACIN) 100 MG capsule Take 1 capsule (100 mg) by mouth 2 times daily (with meals) 60 capsule 4      "timolol (TIMOPTIC) 0.5 % ophthalmic solution instill 1 drop IN EACH EYE TWICE DAILY  5     fluocin-hydroquinone-tretinoin 0.01-4-0.05 % CREA Apply at bedtime (Patient not taking: Reported on 12/12/2017) 60 g 11     metroNIDAZOLE (METROGEL) 1 % gel Apply topically daily (Patient not taking: Reported on 12/12/2017) 60 g 11     No facility-administered encounter medications on file as of 12/11/2018.              Review Of Systems  Skin: As above  Eyes: negative  Ears/Nose/Throat: negative  Respiratory: No shortness of breath, dyspnea on exertion, cough, or hemoptysis  Cardiovascular: negative  Gastrointestinal: negative  Genitourinary: negative  Musculoskeletal: negative  Neurologic: negative  Psychiatric: negative  Hematologic/Lymphatic/Immunologic: negative  Endocrine: negative      O:   NAD, WDWN, Alert & Oriented, Mood & Affect wnl, Vitals stable   Here today alone   BP (!) 157/91   Pulse 57   Ht 1.867 m (6' 1.5\")    General appearance normal   Vitals stable   Alert, oriented and in no acute distress      Following lymph nodes palpated: Occipital, Cervical, Supraclavicular no lad   Stuck on papules and brown macules on trunk and ext    Scalp stuck on papule   R helix gritty papule     The remainder of expanded problem focused exam was unremarkable; the following areas were examined:  scalp/hair, conjunctiva/lids, face, neck, lips, chest, digits/nails, RUE, LUE.      Eyes: Conjunctivae/lids:Normal     ENT: Lips, buccal mucosa, tongue: normal    MSK:Normal    Cardiovascular: peripheral edema none    Pulm: Breathing Normal    Lymph Nodes: No Head and Neck Lymphadenopathy     Neuro/Psych: Orientation:Normal; Mood/Affect:Normal      A/P:  1. Seborrheic keratosis, lentigo,   2. R helix actinic keratosis   LN2:  Treated with LN2 for 5s for 1-2 cycles. Warned risks of blistering, pain, pigment change, scarring, and incomplete resolution.  Advised patient to return if lesions do not completely resolve.  Wound care sheet " given.    BENIGN LESIONS DISCUSSED WITH PATIENT:  I discussed the specifics of tumor, prognosis, and genetics of benign lesions.  I explained that treatment of these lesions would be purely cosmetic and not medically neccessary.  I discussed with patient different removal options including excision, cautery and /or laser.      Nature and genetics of benign skin lesions dicussed with patient.  Signs and Symptoms of skin cancer discussed with patient.  ABCDEs of melanoma reviewed with patient.  Patient encouraged to perform monthly skin exams.  UV precautions reviewed with patient.  Patient to follow up with Primary Care provider regarding elevated blood pressure.  Skin care regimen reviewed with patient: Eliminate harsh soaps, i.e. Dial, zest, irsih spring; Mild soaps such as Cetaphil or Dove sensitive skin, avoid hot or cold showers, aggressive use of emollients including vanicream, cetaphil or cerave discussed with patient.    Risks of non-melanoma skin cancer discussed with patient   Return to clinic 6 months

## 2019-06-18 ENCOUNTER — OFFICE VISIT (OUTPATIENT)
Dept: DERMATOLOGY | Facility: CLINIC | Age: 74
End: 2019-06-18
Payer: COMMERCIAL

## 2019-06-18 VITALS — RESPIRATION RATE: 16 BRPM | HEART RATE: 54 BPM | DIASTOLIC BLOOD PRESSURE: 87 MMHG | SYSTOLIC BLOOD PRESSURE: 162 MMHG

## 2019-06-18 DIAGNOSIS — D23.9 DERMAL NEVUS: ICD-10-CM

## 2019-06-18 DIAGNOSIS — D18.01 HEMANGIOMA OF SKIN: ICD-10-CM

## 2019-06-18 DIAGNOSIS — L81.4 LENTIGO: ICD-10-CM

## 2019-06-18 DIAGNOSIS — Z85.828 HISTORY OF SKIN CANCER: Primary | ICD-10-CM

## 2019-06-18 DIAGNOSIS — L57.0 AK (ACTINIC KERATOSIS): ICD-10-CM

## 2019-06-18 DIAGNOSIS — L82.1 SEBORRHEIC KERATOSIS: ICD-10-CM

## 2019-06-18 PROCEDURE — 17003 DESTRUCT PREMALG LES 2-14: CPT | Performed by: DERMATOLOGY

## 2019-06-18 PROCEDURE — 99213 OFFICE O/P EST LOW 20 MIN: CPT | Mod: 25 | Performed by: DERMATOLOGY

## 2019-06-18 PROCEDURE — 17000 DESTRUCT PREMALG LESION: CPT | Performed by: DERMATOLOGY

## 2019-06-18 NOTE — PROGRESS NOTES
Jean Hernandez is a 73 year old year old male patient here today for rough spot on right cheek.   .  Patient states this has been present for a while.  Patient reports the following symptoms:  scale.  Patient reports the following previous treatments none.  Patient reports the following modifying factors none.  Associated symptoms: none.  Patient has no other skin complaints today.  Remainder of the HPI, Meds, PMH, Allergies, FH, and SH was reviewed in chart.    History reviewed. No pertinent past medical history.    History reviewed. No pertinent surgical history.     Family History   Problem Relation Age of Onset     Melanoma No family hx of        Social History     Socioeconomic History     Marital status:      Spouse name: Not on file     Number of children: Not on file     Years of education: Not on file     Highest education level: Not on file   Occupational History     Not on file   Social Needs     Financial resource strain: Not on file     Food insecurity:     Worry: Not on file     Inability: Not on file     Transportation needs:     Medical: Not on file     Non-medical: Not on file   Tobacco Use     Smoking status: Never Smoker     Smokeless tobacco: Never Used   Substance and Sexual Activity     Alcohol use: Not on file     Drug use: Not on file     Sexual activity: Not on file   Lifestyle     Physical activity:     Days per week: Not on file     Minutes per session: Not on file     Stress: Not on file   Relationships     Social connections:     Talks on phone: Not on file     Gets together: Not on file     Attends Restorationism service: Not on file     Active member of club or organization: Not on file     Attends meetings of clubs or organizations: Not on file     Relationship status: Not on file     Intimate partner violence:     Fear of current or ex partner: Not on file     Emotionally abused: Not on file     Physically abused: Not on file     Forced sexual activity: Not on file   Other Topics  Concern     Not on file   Social History Narrative     Not on file       Outpatient Encounter Medications as of 6/18/2019   Medication Sig Dispense Refill     brimonidine (ALPHAGAN) 0.2 % ophthalmic solution instill 1 drop IN EACH EYE TWICE DAILY  5     latanoprost (XALATAN) 0.005 % ophthalmic solution instill 1 drop into IN EACH EYE EVERY NIGHT AT BEDTIME  5     timolol (TIMOPTIC) 0.5 % ophthalmic solution instill 1 drop IN EACH EYE TWICE DAILY  5     fluocin-hydroquinone-tretinoin 0.01-4-0.05 % CREA Apply at bedtime (Patient not taking: Reported on 12/12/2017) 60 g 11     fluorouracil (EFUDEX) 5 % cream Apply twice daily for two weeks to face (Patient not taking: Reported on 6/18/2019) 40 g 3     metroNIDAZOLE (METROGEL) 1 % gel Apply topically daily (Patient not taking: Reported on 12/12/2017) 60 g 11     minocycline (MINOCIN/DYNACIN) 100 MG capsule Take 1 capsule (100 mg) by mouth 2 times daily (with meals) (Patient not taking: Reported on 6/18/2019) 60 capsule 4     No facility-administered encounter medications on file as of 6/18/2019.              Review Of Systems  Skin: As above  Eyes: negative  Ears/Nose/Throat: negative  Respiratory: No shortness of breath, dyspnea on exertion, cough, or hemoptysis  Cardiovascular: negative  Gastrointestinal: negative  Genitourinary: negative  Musculoskeletal: negative  Neurologic: negative  Psychiatric: negative  Hematologic/Lymphatic/Immunologic: negative  Endocrine: negative      O:   NAD, WDWN, Alert & Oriented, Mood & Affect wnl, Vitals stable   Here today alone   /87   Pulse 54   Resp 16    General appearance normal   Vitals stable   Alert, oriented and in no acute distress      Following lymph nodes palpated: Occipital, Cervical, Supraclavicular no lad   R cheek gritty papules   Comedones on back      Stuck on papules and brown macules on trunk and ext   Red papules on trunk  Flesh colored papules on trunk     The remainder of the full exam was  unremarkable; the following areas were examined:  conjunctiva/lids, oral mucosa, neck, peripheral vascular system, abdomen, lymph nodes, digits/nails, eccrine and apocrine glands, scalp/hair, face, neck, chest, abdomen, buttocks, back, RUE, LUE, RLE, LLE       Eyes: Conjunctivae/lids:Normal     ENT: Lips, buccal mucosa, tongue: normal    MSK:Normal    Cardiovascular: peripheral edema none    Pulm: Breathing Normal    Lymph Nodes: No Head and Neck Lymphadenopathy     Neuro/Psych: Orientation:Normal; Mood/Affect:Normal      A/P:  1. Seborrheic keratosis, lentigo, angioma, dermal nevus, hx of non-melanoma skin cancer, epidermal cyst  2. R cheek actinic keratosis x4  LN2:  Treated with LN2 for 5s for 1-2 cycles. Warned risks of blistering, pain, pigment change, scarring, and incomplete resolution.  Advised patient to return if lesions do not completely resolve.  Wound care sheet given.    BENIGN LESIONS DISCUSSED WITH PATIENT:  I discussed the specifics of tumor, prognosis, and genetics of benign lesions.  I explained that treatment of these lesions would be purely cosmetic and not medically neccessary.  I discussed with patient different removal options including excision, cautery and /or laser.      Nature and genetics of benign skin lesions dicussed with patient.  Signs and Symptoms of skin cancer discussed with patient.  ABCDEs of melanoma reviewed with patient.  Patient encouraged to perform monthly skin exams.  UV precautions reviewed with patient.  Patient to follow up with Primary Care provider regarding elevated blood pressure.  Skin care regimen reviewed with patient: Eliminate harsh soaps, i.e. Dial, zest, irsih spring; Mild soaps such as Cetaphil or Dove sensitive skin, avoid hot or cold showers, aggressive use of emollients including vanicream, cetaphil or cerave discussed with patient.    Risks of non-melanoma skin cancer discussed with patient   Return to clinic 6 months

## 2019-06-18 NOTE — LETTER
6/18/2019         RE: Jean Hernandez  [id number] [facility] [id number]        Dear Colleague,    Thank you for referring your patient, Jean Hernandez, to the [facility]. Please see a copy of my visit note below.    Jean Hernandez is a 73 year old year old male patient here today for rough spot on right cheek.   .  Patient states this has been present for a while.  Patient reports the following symptoms:  scale.  Patient reports the following previous treatments none.  Patient reports the following modifying factors none.  Associated symptoms: none.  Patient has no other skin complaints today.  Remainder of the HPI, Meds, PMH, Allergies, FH, and SH was reviewed in chart.    History reviewed. No pertinent past medical history.    History reviewed. No pertinent surgical history.     Family History   Problem Relation Age of Onset     Melanoma No family hx of        Social History     Socioeconomic History     Marital status:      Spouse name: Not on file     Number of children: Not on file     Years of education: Not on file     Highest education level: Not on file   Occupational History     Not on file   Social Needs     Financial resource strain: Not on file     Food insecurity:     Worry: Not on file     Inability: Not on file     Transportation needs:     Medical: Not on file     Non-medical: Not on file   Tobacco Use     Smoking status: Never Smoker     Smokeless tobacco: Never Used   Substance and Sexual Activity     Alcohol use: Not on file     Drug use: Not on file     Sexual activity: Not on file   Lifestyle     Physical activity:     Days per week: Not on file     Minutes per session: Not on file     Stress: Not on file   Relationships     Social connections:     Talks on phone: Not on file     Gets together: Not on file     Attends Taoist service: Not on file     Active member of club or organization: Not on file     Attends meetings of clubs or organizations: Not on file      Relationship status: Not on file     Intimate partner violence:     Fear of current or ex partner: Not on file     Emotionally abused: Not on file     Physically abused: Not on file     Forced sexual activity: Not on file   Other Topics Concern     Not on file   Social History Narrative     Not on file       Outpatient Encounter Medications as of 6/18/2019   Medication Sig Dispense Refill     brimonidine (ALPHAGAN) 0.2 % ophthalmic solution instill 1 drop IN EACH EYE TWICE DAILY  5     latanoprost (XALATAN) 0.005 % ophthalmic solution instill 1 drop into IN EACH EYE EVERY NIGHT AT BEDTIME  5     timolol (TIMOPTIC) 0.5 % ophthalmic solution instill 1 drop IN EACH EYE TWICE DAILY  5     fluocin-hydroquinone-tretinoin 0.01-4-0.05 % CREA Apply at bedtime (Patient not taking: Reported on 12/12/2017) 60 g 11     fluorouracil (EFUDEX) 5 % cream Apply twice daily for two weeks to face (Patient not taking: Reported on 6/18/2019) 40 g 3     metroNIDAZOLE (METROGEL) 1 % gel Apply topically daily (Patient not taking: Reported on 12/12/2017) 60 g 11     minocycline (MINOCIN/DYNACIN) 100 MG capsule Take 1 capsule (100 mg) by mouth 2 times daily (with meals) (Patient not taking: Reported on 6/18/2019) 60 capsule 4     No facility-administered encounter medications on file as of 6/18/2019.              Review Of Systems  Skin: As above  Eyes: negative  Ears/Nose/Throat: negative  Respiratory: No shortness of breath, dyspnea on exertion, cough, or hemoptysis  Cardiovascular: negative  Gastrointestinal: negative  Genitourinary: negative  Musculoskeletal: negative  Neurologic: negative  Psychiatric: negative  Hematologic/Lymphatic/Immunologic: negative  Endocrine: negative      O:   NAD, WDWN, Alert & Oriented, Mood & Affect wnl, Vitals stable   Here today alone   /87   Pulse 54   Resp 16    General appearance normal   Vitals stable   Alert, oriented and in no acute distress      Following lymph nodes palpated: Occipital,  Cervical, Supraclavicular no lad   R cheek gritty papules   Comedones on back      Stuck on papules and brown macules on trunk and ext   Red papules on trunk  Flesh colored papules on trunk     The remainder of the full exam was unremarkable; the following areas were examined:  conjunctiva/lids, oral mucosa, neck, peripheral vascular system, abdomen, lymph nodes, digits/nails, eccrine and apocrine glands, scalp/hair, face, neck, chest, abdomen, buttocks, back, RUE, LUE, RLE, LLE       Eyes: Conjunctivae/lids:Normal     ENT: Lips, buccal mucosa, tongue: normal    MSK:Normal    Cardiovascular: peripheral edema none    Pulm: Breathing Normal    Lymph Nodes: No Head and Neck Lymphadenopathy     Neuro/Psych: Orientation:Normal; Mood/Affect:Normal      A/P:  1. Seborrheic keratosis, lentigo, angioma, dermal nevus, hx of non-melanoma skin cancer, epidermal cyst  2. R cheek actinic keratosis x4  LN2:  Treated with LN2 for 5s for 1-2 cycles. Warned risks of blistering, pain, pigment change, scarring, and incomplete resolution.  Advised patient to return if lesions do not completely resolve.  Wound care sheet given.    BENIGN LESIONS DISCUSSED WITH PATIENT:  I discussed the specifics of tumor, prognosis, and genetics of benign lesions.  I explained that treatment of these lesions would be purely cosmetic and not medically neccessary.  I discussed with patient different removal options including excision, cautery and /or laser.      Nature and genetics of benign skin lesions dicussed with patient.  Signs and Symptoms of skin cancer discussed with patient.  ABCDEs of melanoma reviewed with patient.  Patient encouraged to perform monthly skin exams.  UV precautions reviewed with patient.  Patient to follow up with Primary Care provider regarding elevated blood pressure.  Skin care regimen reviewed with patient: Eliminate harsh soaps, i.e. Dial, zest, irsih spring; Mild soaps such as Cetaphil or Dove sensitive skin, avoid hot or  cold showers, aggressive use of emollients including vanicream, cetaphil or cerave discussed with patient.    Risks of non-melanoma skin cancer discussed with patient   Return to clinic 6 months      Again, thank you for allowing me to participate in the care of your patient.        Sincerely,        Balta Tucker MD

## 2019-09-30 DIAGNOSIS — L71.9 ROSACEA: ICD-10-CM

## 2019-09-30 RX ORDER — MINOCYCLINE HYDROCHLORIDE 100 MG/1
100 CAPSULE ORAL 2 TIMES DAILY WITH MEALS
Qty: 60 CAPSULE | Refills: 1 | Status: SHIPPED | OUTPATIENT
Start: 2019-09-30 | End: 2020-01-23

## 2019-09-30 NOTE — TELEPHONE ENCOUNTER
Reason for Call:  Medication or medication refill:    Do you use a [facility]?  Name of the pharmacy and phone number for the current request:  [facility] (Ph: [phone])    Name of the medication requested: Minocycline    Other request:   LAST REFILL: 08/05/2019  LOV: 06/18/2019    Can we leave a detailed message on this number? Not Applicable    Phone number patient can be reached at: Home number on file [phone] (home)    Best Time: NA    Call taken on 9/30/2019 at 7:52 AM by Denise Behrendt

## 2019-12-18 ENCOUNTER — OFFICE VISIT (OUTPATIENT)
Dept: DERMATOLOGY | Facility: CLINIC | Age: 74
End: 2019-12-18
Payer: COMMERCIAL

## 2019-12-18 ENCOUNTER — TELEPHONE (OUTPATIENT)
Dept: DERMATOLOGY | Facility: CLINIC | Age: 74
End: 2019-12-18

## 2019-12-18 VITALS — DIASTOLIC BLOOD PRESSURE: 84 MMHG | SYSTOLIC BLOOD PRESSURE: 166 MMHG | HEART RATE: 50 BPM | OXYGEN SATURATION: 99 %

## 2019-12-18 DIAGNOSIS — L57.0 AK (ACTINIC KERATOSIS): Primary | ICD-10-CM

## 2019-12-18 DIAGNOSIS — L81.4 LENTIGO: ICD-10-CM

## 2019-12-18 DIAGNOSIS — D23.9 DERMAL NEVUS: ICD-10-CM

## 2019-12-18 DIAGNOSIS — D18.01 ANGIOMA OF SKIN: ICD-10-CM

## 2019-12-18 DIAGNOSIS — L82.1 SEBORRHEIC KERATOSIS: ICD-10-CM

## 2019-12-18 PROCEDURE — 99213 OFFICE O/P EST LOW 20 MIN: CPT | Performed by: DERMATOLOGY

## 2019-12-18 RX ORDER — FLUOROURACIL 50 MG/G
CREAM TOPICAL
Qty: 40 G | Refills: 1 | Status: SHIPPED | OUTPATIENT
Start: 2019-12-18 | End: 2022-07-19

## 2019-12-18 RX ORDER — CALCIPOTRIENE 50 UG/G
CREAM TOPICAL
Qty: 60 G | Refills: 3 | Status: SHIPPED | OUTPATIENT
Start: 2019-12-18 | End: 2022-07-19

## 2019-12-18 NOTE — TELEPHONE ENCOUNTER
Called pharmacy and let them know they could try Goodrx for a cheaper price. Also explained that there is not an alternative for efudex. Pt was also prescribed Dovonox. Any thoughts and could pt do PDT instead.   Ileana BEE RN BSN PHN  [facility]

## 2019-12-18 NOTE — TELEPHONE ENCOUNTER
Pelon from [facility] in [place] is asking for cheaper medications for pt, please call to discuss [phone]

## 2019-12-18 NOTE — LETTER
12/18/2019         RE: Jean Hernandez  [id number] [facility] [address]        Dear Colleague,    Thank you for referring your patient, Jean Hernandez, to the [facility]. Please see a copy of my visit note below.    Jean Hernandez is a 74 year old year old male patient here today for rough spots on temples.   .  Patient states this has been present for a while.  Patient reports the following symptoms:  scale.  Patient reports the following previous treatments none.  These treatments did not work.  Patient reports the following modifying factors none.  Associated symptoms: none.  Patient has no other skin complaints today.  Remainder of the HPI, Meds, PMH, Allergies, FH, and SH was reviewed in chart.    History reviewed. No pertinent past medical history.    History reviewed. No pertinent surgical history.     Family History   Problem Relation Age of Onset     Melanoma No family hx of        Social History     Socioeconomic History     Marital status:      Spouse name: Not on file     Number of children: Not on file     Years of education: Not on file     Highest education level: Not on file   Occupational History     Not on file   Social Needs     Financial resource strain: Not on file     Food insecurity:     Worry: Not on file     Inability: Not on file     Transportation needs:     Medical: Not on file     Non-medical: Not on file   Tobacco Use     Smoking status: Never Smoker     Smokeless tobacco: Never Used   Substance and Sexual Activity     Alcohol use: Not on file     Drug use: Not on file     Sexual activity: Not on file   Lifestyle     Physical activity:     Days per week: Not on file     Minutes per session: Not on file     Stress: Not on file   Relationships     Social connections:     Talks on phone: Not on file     Gets together: Not on file     Attends Orthodox service: Not on file     Active member of club or organization: Not on file     Attends meetings of clubs or  organizations: Not on file     Relationship status: Not on file     Intimate partner violence:     Fear of current or ex partner: Not on file     Emotionally abused: Not on file     Physically abused: Not on file     Forced sexual activity: Not on file   Other Topics Concern     Not on file   Social History Narrative     Not on file       Outpatient Encounter Medications as of 12/18/2019   Medication Sig Dispense Refill     brimonidine (ALPHAGAN) 0.2 % ophthalmic solution instill 1 drop IN EACH EYE TWICE DAILY  5     latanoprost (XALATAN) 0.005 % ophthalmic solution instill 1 drop into IN EACH EYE EVERY NIGHT AT BEDTIME  5     minocycline (MINOCIN/DYNACIN) 100 MG capsule Take 1 capsule (100 mg) by mouth 2 times daily (with meals) 60 capsule 1     timolol (TIMOPTIC) 0.5 % ophthalmic solution instill 1 drop IN EACH EYE TWICE DAILY  5     fluocin-hydroquinone-tretinoin 0.01-4-0.05 % CREA Apply at bedtime (Patient not taking: Reported on 12/12/2017) 60 g 11     fluorouracil (EFUDEX) 5 % cream Apply twice daily for two weeks to face (Patient not taking: Reported on 6/18/2019) 40 g 3     metroNIDAZOLE (METROGEL) 1 % gel Apply topically daily (Patient not taking: Reported on 12/12/2017) 60 g 11     No facility-administered encounter medications on file as of 12/18/2019.              Review Of Systems  Skin: As above  Eyes: negative  Ears/Nose/Throat: negative  Respiratory: No shortness of breath, dyspnea on exertion, cough, or hemoptysis  Cardiovascular: negative  Gastrointestinal: negative  Genitourinary: negative  Musculoskeletal: negative  Neurologic: negative  Psychiatric: negative  Hematologic/Lymphatic/Immunologic: negative  Endocrine: negative      O:   NAD, WDWN, Alert & Oriented, Mood & Affect wnl, Vitals stable   Here today alone   BP (!) 166/84   Pulse 50   SpO2 99%    General appearance normal   Vitals stable   Alert, oriented and in no acute distress      Following lymph nodes palpated: Occipital, Cervical,  Supraclavicular no lad   Gritty papules on face      Stuck on papules and brown macules on trunk and ext   Red papules on trunk  Flesh colored papules on trunk     The remainder of expanded problem focused exam was normal ; the following areas were examined:  scalp/hair, conjunctiva/lids, face, neck, lipslips/teeth, chest, digits/nails, RUE, LUE.      Eyes: Conjunctivae/lids:Normal     ENT: Lips, buccal mucosa, tongue: normal    MSK:Normal    Cardiovascular: peripheral edema none    Pulm: Breathing Normal    Lymph Nodes: No Head and Neck Lymphadenopathy     Neuro/Psych: Orientation:Alert and Orientedx3 ; Mood/Affect:normal       A/P:  1. Seborrheic keratosis, lentigo, angioma, dermal nevus  2. Actinic keratosis   Efudex discussed with patient   Using 5-Flurouracil Cream    5-Fluorouracil (5FU) topical cream (brand names Efudex, Carac) is a prescription topical medicine to treat actinic keratoses (pre-squamous cell skin cancer lesions), sun-damaged skin as well as superficial skin cancers.    When applied the areas of sun-damaged skin, the 5FU will  find  damaged skin cells and destroy them.   During treatment, the skin will become red and look very irritated. This is the expected  normal response,  Some patients using 5FU show minimal redness and scaling while others have a very  vigorous  response where the skin scabs and peels. The important thing to realize is that 5FU is treating sun-damaged skin that carries skin cancer risk.      While the skin is irritated, open, sore or scabbed you can apply aquaphor, vaseline or 1% hydrocortisone cream in the morning.     You should apply a thin layer of the cream to the affected area twice a day for 2  weeks every night. A strip of cream the length of your finger tip should be enough to cover your entire face.  For tougher skin like arms, legs, or back, we may suggest longer treatment plans.  However if you react really strong and fast, you might stop earlier or use less  frequently. - please call if it is very strong and you are concern you might need to stop early.     If you prescription coverage allows we may add calcipotriene (Dovonex ), a vitamin-D derivative, to the treatment plan.  In these cases we will have you mix the calcipotriene with the efudex to help shorten the treatment course and improve outcomes.      Typically very strong reactions are related to lots of underlying sun damage, and this means you are getting a good response to the medication. However, there is no need to be miserable while using this. Please let us know if you are having trouble or concerns!    BENIGN LESIONS DISCUSSED WITH PATIENT:  I discussed the specifics of tumor, prognosis, and genetics of benign lesions.  I explained that treatment of these lesions would be purely cosmetic and not medically neccessary.  I discussed with patient different removal options including excision, cautery and /or laser.      Nature and genetics of benign skin lesions dicussed with patient.  Signs and Symptoms of skin cancer discussed with patient.  ABCDEs of melanoma reviewed with patient.  Patient encouraged to perform monthly skin exams.  UV precautions reviewed with patient.  Patient to follow up with Primary Care provider regarding elevated blood pressure.  Skin care regimen reviewed with patient: Eliminate harsh soaps, i.e. Dial, zest, irsih spring; Mild soaps such as Cetaphil or Dove sensitive skin, avoid hot or cold showers, aggressive use of emollients including vanicream, cetaphil or cerave discussed with patient.    Risks of non-melanoma skin cancer discussed with patient   Return to clinic 3 months      Again, thank you for allowing me to participate in the care of your patient.        Sincerely,        Balta Tucker MD

## 2019-12-18 NOTE — PROGRESS NOTES
Jean Hernandez is a 74 year old year old male patient here today for rough spots on temples.   .  Patient states this has been present for a while.  Patient reports the following symptoms:  scale.  Patient reports the following previous treatments none.  These treatments did not work.  Patient reports the following modifying factors none.  Associated symptoms: none.  Patient has no other skin complaints today.  Remainder of the HPI, Meds, PMH, Allergies, FH, and SH was reviewed in chart.    History reviewed. No pertinent past medical history.    History reviewed. No pertinent surgical history.     Family History   Problem Relation Age of Onset     Melanoma No family hx of        Social History     Socioeconomic History     Marital status:      Spouse name: Not on file     Number of children: Not on file     Years of education: Not on file     Highest education level: Not on file   Occupational History     Not on file   Social Needs     Financial resource strain: Not on file     Food insecurity:     Worry: Not on file     Inability: Not on file     Transportation needs:     Medical: Not on file     Non-medical: Not on file   Tobacco Use     Smoking status: Never Smoker     Smokeless tobacco: Never Used   Substance and Sexual Activity     Alcohol use: Not on file     Drug use: Not on file     Sexual activity: Not on file   Lifestyle     Physical activity:     Days per week: Not on file     Minutes per session: Not on file     Stress: Not on file   Relationships     Social connections:     Talks on phone: Not on file     Gets together: Not on file     Attends Mu-ism service: Not on file     Active member of club or organization: Not on file     Attends meetings of clubs or organizations: Not on file     Relationship status: Not on file     Intimate partner violence:     Fear of current or ex partner: Not on file     Emotionally abused: Not on file     Physically abused: Not on file     Forced sexual activity:  Not on file   Other Topics Concern     Not on file   Social History Narrative     Not on file       Outpatient Encounter Medications as of 12/18/2019   Medication Sig Dispense Refill     brimonidine (ALPHAGAN) 0.2 % ophthalmic solution instill 1 drop IN EACH EYE TWICE DAILY  5     latanoprost (XALATAN) 0.005 % ophthalmic solution instill 1 drop into IN EACH EYE EVERY NIGHT AT BEDTIME  5     minocycline (MINOCIN/DYNACIN) 100 MG capsule Take 1 capsule (100 mg) by mouth 2 times daily (with meals) 60 capsule 1     timolol (TIMOPTIC) 0.5 % ophthalmic solution instill 1 drop IN EACH EYE TWICE DAILY  5     fluocin-hydroquinone-tretinoin 0.01-4-0.05 % CREA Apply at bedtime (Patient not taking: Reported on 12/12/2017) 60 g 11     fluorouracil (EFUDEX) 5 % cream Apply twice daily for two weeks to face (Patient not taking: Reported on 6/18/2019) 40 g 3     metroNIDAZOLE (METROGEL) 1 % gel Apply topically daily (Patient not taking: Reported on 12/12/2017) 60 g 11     No facility-administered encounter medications on file as of 12/18/2019.              Review Of Systems  Skin: As above  Eyes: negative  Ears/Nose/Throat: negative  Respiratory: No shortness of breath, dyspnea on exertion, cough, or hemoptysis  Cardiovascular: negative  Gastrointestinal: negative  Genitourinary: negative  Musculoskeletal: negative  Neurologic: negative  Psychiatric: negative  Hematologic/Lymphatic/Immunologic: negative  Endocrine: negative      O:   NAD, WDWN, Alert & Oriented, Mood & Affect wnl, Vitals stable   Here today alone   BP (!) 166/84   Pulse 50   SpO2 99%    General appearance normal   Vitals stable   Alert, oriented and in no acute distress      Following lymph nodes palpated: Occipital, Cervical, Supraclavicular no lad   Gritty papules on face      Stuck on papules and brown macules on trunk and ext   Red papules on trunk  Flesh colored papules on trunk     The remainder of expanded problem focused exam was normal ; the following  areas were examined:  scalp/hair, conjunctiva/lids, face, neck, lipslips/teeth, chest, digits/nails, RUE, LUE.      Eyes: Conjunctivae/lids:Normal     ENT: Lips, buccal mucosa, tongue: normal    MSK:Normal    Cardiovascular: peripheral edema none    Pulm: Breathing Normal    Lymph Nodes: No Head and Neck Lymphadenopathy     Neuro/Psych: Orientation:Alert and Orientedx3 ; Mood/Affect:normal       A/P:  1. Seborrheic keratosis, lentigo, angioma, dermal nevus  2. Actinic keratosis   Efudex discussed with patient   Using 5-Flurouracil Cream    5-Fluorouracil (5FU) topical cream (brand names Efudex, Carac) is a prescription topical medicine to treat actinic keratoses (pre-squamous cell skin cancer lesions), sun-damaged skin as well as superficial skin cancers.    When applied the areas of sun-damaged skin, the 5FU will  find  damaged skin cells and destroy them.   During treatment, the skin will become red and look very irritated. This is the expected  normal response,  Some patients using 5FU show minimal redness and scaling while others have a very  vigorous  response where the skin scabs and peels. The important thing to realize is that 5FU is treating sun-damaged skin that carries skin cancer risk.      While the skin is irritated, open, sore or scabbed you can apply aquaphor, vaseline or 1% hydrocortisone cream in the morning.     You should apply a thin layer of the cream to the affected area twice a day for 2  weeks every night. A strip of cream the length of your finger tip should be enough to cover your entire face.  For tougher skin like arms, legs, or back, we may suggest longer treatment plans.  However if you react really strong and fast, you might stop earlier or use less frequently. - please call if it is very strong and you are concern you might need to stop early.     If you prescription coverage allows we may add calcipotriene (Dovonex ), a vitamin-D derivative, to the treatment plan.  In these cases we  will have you mix the calcipotriene with the efudex to help shorten the treatment course and improve outcomes.      Typically very strong reactions are related to lots of underlying sun damage, and this means you are getting a good response to the medication. However, there is no need to be miserable while using this. Please let us know if you are having trouble or concerns!    BENIGN LESIONS DISCUSSED WITH PATIENT:  I discussed the specifics of tumor, prognosis, and genetics of benign lesions.  I explained that treatment of these lesions would be purely cosmetic and not medically neccessary.  I discussed with patient different removal options including excision, cautery and /or laser.      Nature and genetics of benign skin lesions dicussed with patient.  Signs and Symptoms of skin cancer discussed with patient.  ABCDEs of melanoma reviewed with patient.  Patient encouraged to perform monthly skin exams.  UV precautions reviewed with patient.  Patient to follow up with Primary Care provider regarding elevated blood pressure.  Skin care regimen reviewed with patient: Eliminate harsh soaps, i.e. Dial, zest, irsih spring; Mild soaps such as Cetaphil or Dove sensitive skin, avoid hot or cold showers, aggressive use of emollients including vanicream, cetaphil or cerave discussed with patient.    Risks of non-melanoma skin cancer discussed with patient   Return to clinic 3 months

## 2019-12-18 NOTE — TELEPHONE ENCOUNTER
Spoke to patient and dicussed using just the Efudex and skipping the Dovonox.  Pt thought he would check on the price of just the Efudex and call if he decided to do PDT.   Ileana BEE RN BSN PHN  [facility]

## 2019-12-18 NOTE — PATIENT INSTRUCTIONS
Using 5-Flurouracil Cream    5-Fluorouracil (5FU) topical cream (brand names Efudex, Carac) is a prescription topical medicine to treat actinic keratoses (pre-squamous cell skin cancer lesions), sun-damaged skin as well as superficial skin cancers.    When applied the areas of sun-damaged skin, the 5FU will  find  damaged skin cells and destroy them.   During treatment, the skin will become red and look very irritated. This is the expected  normal response,  Some patients using 5FU show minimal redness and scaling while others have a very  vigorous  response where the skin scabs and peels. The important thing to realize is that 5FU is treating sun-damaged skin that carries skin cancer risk.      While the skin is irritated, open, sore or scabbed you can apply aquaphor, vaseline or 1% hydrocortisone cream in the morning.     You should apply a thin layer of the cream to the affected area twice a day for 2  weeks every night. A strip of cream the length of your finger tip should be enough to cover your entire face.  For tougher skin like arms, legs, or back, we may suggest longer treatment plans.  However if you react really strong and fast, you might stop earlier or use less frequently. - please call if it is very strong and you are concern you might need to stop early.     If you prescription coverage allows we may add calcipotriene (Dovonex ), a vitamin-D derivative, to the treatment plan.  In these cases we will have you mix the calcipotriene with the efudex to help shorten the treatment course and improve outcomes.      Typically very strong reactions are related to lots of underlying sun damage, and this means you are getting a good response to the medication. However, there is no need to be miserable while using this. Please let us know if you are having trouble or concerns!

## 2020-01-22 DIAGNOSIS — L71.9 ROSACEA: ICD-10-CM

## 2020-01-22 NOTE — TELEPHONE ENCOUNTER
Requested Prescriptions   Pending Prescriptions Disp Refills     minocycline (MINOCIN/DYNACIN) 100 MG capsule 60 capsule 1     Sig: Take 1 capsule (100 mg) by mouth 2 times daily (with meals)       There is no refill protocol information for this order        Last Written Prescription Date:    Last Fill Quantity: ,  # refills:    Last office visit: 12/18/2019 with prescribing provider:  Caitlin   Future Office Visit:   Next 5 appointments (look out 90 days)    Mar 17, 2020 10:00 AM CDT  [facility] Balta Tucker [facility]  [facility] [address] [place] [id number] [phone]

## 2020-01-23 RX ORDER — MINOCYCLINE HYDROCHLORIDE 100 MG/1
100 CAPSULE ORAL 2 TIMES DAILY WITH MEALS
Qty: 60 CAPSULE | Refills: 1 | Status: SHIPPED | OUTPATIENT
Start: 2020-01-23 | End: 2020-04-22

## 2020-01-23 NOTE — TELEPHONE ENCOUNTER
Ast seen 12-18-19 and Roscea not mentioned in dictation.     Do you wish to refill Minocycline?...    Please advise. Sofia Alberts RN

## 2020-04-22 DIAGNOSIS — L71.9 ROSACEA: ICD-10-CM

## 2020-04-22 RX ORDER — MINOCYCLINE HYDROCHLORIDE 100 MG/1
100 CAPSULE ORAL 2 TIMES DAILY WITH MEALS
Qty: 60 CAPSULE | Refills: 1 | Status: SHIPPED | OUTPATIENT
Start: 2020-04-22 | End: 2020-08-27

## 2020-04-22 NOTE — TELEPHONE ENCOUNTER
Requested Prescriptions   Pending Prescriptions Disp Refills     minocycline (MINOCIN) 100 MG capsule 60 capsule 1     Sig: Take 1 capsule (100 mg) by mouth 2 times daily (with meals)       There is no refill protocol information for this order        Last Written Prescription Date:    Last Fill Quantity: ,  # refills:    Last office visit: 12/18/2019 with prescribing provider:  Antionette   Future Office Visit:   Next 5 appointments (look out 90 days)    Jun 23, 2020 10:00 AM CDT  [facility] Bal[facility] Tucker MD  [facility] [address] [place] [id number]  [phone]

## 2020-04-22 NOTE — TELEPHONE ENCOUNTER
No recent notes about taking Minocycline. Do you wish to refill? Pt takes for rosacea.  Ileana BEE RN BSN PHN  [facility]

## 2020-06-23 ENCOUNTER — OFFICE VISIT (OUTPATIENT)
Dept: DERMATOLOGY | Facility: CLINIC | Age: 75
End: 2020-06-23
Payer: COMMERCIAL

## 2020-06-23 VITALS — HEART RATE: 54 BPM | SYSTOLIC BLOOD PRESSURE: 165 MMHG | OXYGEN SATURATION: 99 % | DIASTOLIC BLOOD PRESSURE: 88 MMHG

## 2020-06-23 DIAGNOSIS — Z87.2 HISTORY OF ACTINIC KERATOSES: Primary | ICD-10-CM

## 2020-06-23 DIAGNOSIS — L81.4 LENTIGO: ICD-10-CM

## 2020-06-23 DIAGNOSIS — L82.1 SEBORRHEIC KERATOSIS: ICD-10-CM

## 2020-06-23 PROCEDURE — 99213 OFFICE O/P EST LOW 20 MIN: CPT | Performed by: DERMATOLOGY

## 2020-06-23 NOTE — PROGRESS NOTES
Jean Hernandez is a 74 year old year old male patient here today for f/u efudex.  Did great.  He notes scaly spot on right temple.   .  Patient states this has been present for a while.  Patient reports the following symptoms:  scale.  Patient reports the following previous treatments efudex.  These treatments did not work.  Patient reports the following modifying factors none.  Associated symptoms: none.  Patient has no other skin complaints today.  Remainder of the HPI, Meds, PMH, Allergies, FH, and SH was reviewed in chart.    History reviewed. No pertinent past medical history.    History reviewed. No pertinent surgical history.     Family History   Problem Relation Age of Onset     Melanoma No family hx of        Social History     Socioeconomic History     Marital status:      Spouse name: Not on file     Number of children: Not on file     Years of education: Not on file     Highest education level: Not on file   Occupational History     Not on file   Social Needs     Financial resource strain: Not on file     Food insecurity     Worry: Not on file     Inability: Not on file     Transportation needs     Medical: Not on file     Non-medical: Not on file   Tobacco Use     Smoking status: Never Smoker     Smokeless tobacco: Never Used   Substance and Sexual Activity     Alcohol use: Not on file     Drug use: Not on file     Sexual activity: Not on file   Lifestyle     Physical activity     Days per week: Not on file     Minutes per session: Not on file     Stress: Not on file   Relationships     Social connections     Talks on phone: Not on file     Gets together: Not on file     Attends Mu-ism service: Not on file     Active member of club or organization: Not on file     Attends meetings of clubs or organizations: Not on file     Relationship status: Not on file     Intimate partner violence     Fear of current or ex partner: Not on file     Emotionally abused: Not on file     Physically abused: Not on  file     Forced sexual activity: Not on file   Other Topics Concern     Not on file   Social History Narrative     Not on file       Outpatient Encounter Medications as of 6/23/2020   Medication Sig Dispense Refill     brimonidine (ALPHAGAN) 0.2 % ophthalmic solution instill 1 drop IN EACH EYE TWICE DAILY  5     fluorouracil (EFUDEX) 5 % external cream Mix with dovonex and apply twice daily to face for ten days 40 g 1     latanoprost (XALATAN) 0.005 % ophthalmic solution instill 1 drop into IN EACH EYE EVERY NIGHT AT BEDTIME  5     metroNIDAZOLE (METROGEL) 1 % gel Apply topically daily 60 g 11     minocycline (MINOCIN) 100 MG capsule Take 1 capsule (100 mg) by mouth 2 times daily (with meals) 60 capsule 1     timolol (TIMOPTIC) 0.5 % ophthalmic solution instill 1 drop IN EACH EYE TWICE DAILY  5     calcipotriene (DOVONEX) 0.005 % external cream Mix with efudex and apply twice daily to face for ten days (Patient not taking: Reported on 6/23/2020) 60 g 3     fluocin-hydroquinone-tretinoin 0.01-4-0.05 % CREA Apply at bedtime (Patient not taking: Reported on 12/12/2017) 60 g 11     fluorouracil (EFUDEX) 5 % cream Apply twice daily for two weeks to face (Patient not taking: Reported on 6/18/2019) 40 g 3     No facility-administered encounter medications on file as of 6/23/2020.              Review Of Systems  Skin: As above  Eyes: negative  Ears/Nose/Throat: negative  Respiratory: No shortness of breath, dyspnea on exertion, cough, or hemoptysis  Cardiovascular: negative  Gastrointestinal: negative  Genitourinary: negative  Musculoskeletal: negative  Neurologic: negative  Psychiatric: negative  Hematologic/Lymphatic/Immunologic: negative  Endocrine: negative      O:   NAD, WDWN, Alert & Oriented, Mood & Affect wnl, Vitals stable   Here today alone   BP (!) 165/88   Pulse 54   SpO2 99%    General appearance normal   Vitals stable   Alert, oriented and in no acute distress     R tmeple stuck on appule   Face clear of  actinic keratosis      brown macules on face     The remainder of expanded problem focused exam was normal; the following areas were examined:  scalp/hair, conjunctiva/lids, face, neck, lips, chest, digits/nails, RUE, LUE.      Eyes: Conjunctivae/lids:Normal     ENT: Lips, buccal mucosa, tongue: normal    MSK:Normal    Cardiovascular: peripheral edema none    Pulm: Breathing Normal    Neuro/Psych: Orientation:Alert and Orientedx3 ; Mood/Affect:normal       A/P:  1. Hx of actinic keratosis s/p efudex did well  2. Seborrheic keratosis, letnigo  BENIGN LESIONS DISCUSSED WITH PATIENT:  I discussed the specifics of tumor, prognosis, and genetics of benign lesions.  I explained that treatment of these lesions would be purely cosmetic and not medically neccessary.  I discussed with patient different removal options including excision, cautery and /or laser.      Nature and genetics of benign skin lesions dicussed with patient.  Signs and Symptoms of skin cancer discussed with patient.  Patient encouraged to perform monthly skin exams.  UV precautions reviewed with patient.  Skin care regimen reviewed with patient: Eliminate harsh soaps, i.e. Dial, zest, irsih spring; Mild soaps such as Cetaphil or Dove sensitive skin, avoid hot or cold showers, aggressive use of emollients including vanicream, cetaphil or cerave discussed with patient.    Risks of non-melanoma skin cancer discussed with patient   Return to clinic 6 months

## 2020-06-23 NOTE — LETTER
6/23/2020         RE: Jean Hernandez  [id number] [facility] [id number]        Dear Colleague,    Thank you for referring your patient, Jean Hernandez, to the [facility]. Please see a copy of my visit note below.    Jean Hernandez is a 74 year old year old male patient here today for f/u efudex.  Did great.  He notes scaly spot on right temple.   .  Patient states this has been present for a while.  Patient reports the following symptoms:  scale.  Patient reports the following previous treatments efudex.  These treatments did not work.  Patient reports the following modifying factors none.  Associated symptoms: none.  Patient has no other skin complaints today.  Remainder of the HPI, Meds, PMH, Allergies, FH, and SH was reviewed in chart.    History reviewed. No pertinent past medical history.    History reviewed. No pertinent surgical history.     Family History   Problem Relation Age of Onset     Melanoma No family hx of        Social History     Socioeconomic History     Marital status:      Spouse name: Not on file     Number of children: Not on file     Years of education: Not on file     Highest education level: Not on file   Occupational History     Not on file   Social Needs     Financial resource strain: Not on file     Food insecurity     Worry: Not on file     Inability: Not on file     Transportation needs     Medical: Not on file     Non-medical: Not on file   Tobacco Use     Smoking status: Never Smoker     Smokeless tobacco: Never Used   Substance and Sexual Activity     Alcohol use: Not on file     Drug use: Not on file     Sexual activity: Not on file   Lifestyle     Physical activity     Days per week: Not on file     Minutes per session: Not on file     Stress: Not on file   Relationships     Social connections     Talks on phone: Not on file     Gets together: Not on file     Attends Tenriism service: Not on file     Active member of club or organization: Not on file      Attends meetings of clubs or organizations: Not on file     Relationship status: Not on file     Intimate partner violence     Fear of current or ex partner: Not on file     Emotionally abused: Not on file     Physically abused: Not on file     Forced sexual activity: Not on file   Other Topics Concern     Not on file   Social History Narrative     Not on file       Outpatient Encounter Medications as of 6/23/2020   Medication Sig Dispense Refill     brimonidine (ALPHAGAN) 0.2 % ophthalmic solution instill 1 drop IN EACH EYE TWICE DAILY  5     fluorouracil (EFUDEX) 5 % external cream Mix with dovonex and apply twice daily to face for ten days 40 g 1     latanoprost (XALATAN) 0.005 % ophthalmic solution instill 1 drop into IN EACH EYE EVERY NIGHT AT BEDTIME  5     metroNIDAZOLE (METROGEL) 1 % gel Apply topically daily 60 g 11     minocycline (MINOCIN) 100 MG capsule Take 1 capsule (100 mg) by mouth 2 times daily (with meals) 60 capsule 1     timolol (TIMOPTIC) 0.5 % ophthalmic solution instill 1 drop IN EACH EYE TWICE DAILY  5     calcipotriene (DOVONEX) 0.005 % external cream Mix with efudex and apply twice daily to face for ten days (Patient not taking: Reported on 6/23/2020) 60 g 3     fluocin-hydroquinone-tretinoin 0.01-4-0.05 % CREA Apply at bedtime (Patient not taking: Reported on 12/12/2017) 60 g 11     fluorouracil (EFUDEX) 5 % cream Apply twice daily for two weeks to face (Patient not taking: Reported on 6/18/2019) 40 g 3     No facility-administered encounter medications on file as of 6/23/2020.              Review Of Systems  Skin: As above  Eyes: negative  Ears/Nose/Throat: negative  Respiratory: No shortness of breath, dyspnea on exertion, cough, or hemoptysis  Cardiovascular: negative  Gastrointestinal: negative  Genitourinary: negative  Musculoskeletal: negative  Neurologic: negative  Psychiatric: negative  Hematologic/Lymphatic/Immunologic: negative  Endocrine: negative      O:   NAD, WDWN, Alert &  Oriented, Mood & Affect wnl, Vitals stable   Here today alone   BP (!) 165/88   Pulse 54   SpO2 99%    General appearance normal   Vitals stable   Alert, oriented and in no acute distress     R tmeple stuck on appule   Face clear of actinic keratosis      brown macules on face     The remainder of expanded problem focused exam was normal; the following areas were examined:  scalp/hair, conjunctiva/lids, face, neck, lips, chest, digits/nails, RUE, LUE.      Eyes: Conjunctivae/lids:Normal     ENT: Lips, buccal mucosa, tongue: normal    MSK:Normal    Cardiovascular: peripheral edema none    Pulm: Breathing Normal    Neuro/Psych: Orientation:Alert and Orientedx3 ; Mood/Affect:normal       A/P:  1. Hx of actinic keratosis s/p efudex did well  2. Seborrheic keratosis, letnigo  BENIGN LESIONS DISCUSSED WITH PATIENT:  I discussed the specifics of tumor, prognosis, and genetics of benign lesions.  I explained that treatment of these lesions would be purely cosmetic and not medically neccessary.  I discussed with patient different removal options including excision, cautery and /or laser.      Nature and genetics of benign skin lesions dicussed with patient.  Signs and Symptoms of skin cancer discussed with patient.  Patient encouraged to perform monthly skin exams.  UV precautions reviewed with patient.  Skin care regimen reviewed with patient: Eliminate harsh soaps, i.e. Dial, zest, irsih spring; Mild soaps such as Cetaphil or Dove sensitive skin, avoid hot or cold showers, aggressive use of emollients including vanicream, cetaphil or cerave discussed with patient.    Risks of non-melanoma skin cancer discussed with patient   Return to clinic 6 months      Again, thank you for allowing me to participate in the care of your patient.        Sincerely,        Balta Tucker MD

## 2020-08-27 DIAGNOSIS — L71.9 ROSACEA: ICD-10-CM

## 2020-08-27 RX ORDER — MINOCYCLINE HYDROCHLORIDE 100 MG/1
100 CAPSULE ORAL 2 TIMES DAILY WITH MEALS
Qty: 60 CAPSULE | Refills: 1 | Status: SHIPPED | OUTPATIENT
Start: 2020-08-27 | End: 2021-01-14

## 2020-08-27 NOTE — TELEPHONE ENCOUNTER
Requested Prescriptions   Pending Prescriptions Disp Refills     minocycline (MINOCIN) 100 MG capsule 60 capsule 1     Sig: Take 1 capsule (100 mg) by mouth 2 times daily (with meals)       There is no refill protocol information for this order        Last office visit: 6/23/2020 with prescribing provider:  Dr. Tucker   Future Office Visit:          Denise Behrendt  Specialty CSS

## 2020-12-15 ENCOUNTER — OFFICE VISIT (OUTPATIENT)
Dept: DERMATOLOGY | Facility: CLINIC | Age: 75
End: 2020-12-15
Payer: COMMERCIAL

## 2020-12-15 VITALS — OXYGEN SATURATION: 99 % | HEART RATE: 53 BPM | DIASTOLIC BLOOD PRESSURE: 81 MMHG | SYSTOLIC BLOOD PRESSURE: 161 MMHG

## 2020-12-15 DIAGNOSIS — D23.9 DERMAL NEVUS: ICD-10-CM

## 2020-12-15 DIAGNOSIS — L81.6 POIKILODERMA: ICD-10-CM

## 2020-12-15 DIAGNOSIS — L81.4 LENTIGO: Primary | ICD-10-CM

## 2020-12-15 DIAGNOSIS — L82.1 SEBORRHEIC KERATOSIS: ICD-10-CM

## 2020-12-15 DIAGNOSIS — D18.01 ANGIOMA OF SKIN: ICD-10-CM

## 2020-12-15 PROCEDURE — 99213 OFFICE O/P EST LOW 20 MIN: CPT | Performed by: DERMATOLOGY

## 2020-12-15 NOTE — PROGRESS NOTES
Jean Hernandez is an extremely pleasant 75 year old year old male patient here today for spot on scalp.   .  Patient states this has been present for a while.  Patient reports the following symptoms:  rough.  Patient reports the following previous treatments none.  These treatments did not work.  Patient reports the following modifying factors none.  Associated symptoms: none.  Patient has no other skin complaints today.  Remainder of the HPI, Meds, PMH, Allergies, FH, and SH was reviewed in chart.    No past medical history on file.    No past surgical history on file.     Family History   Problem Relation Age of Onset     Melanoma No family hx of        Social History     Socioeconomic History     Marital status:      Spouse name: Not on file     Number of children: Not on file     Years of education: Not on file     Highest education level: Not on file   Occupational History     Not on file   Social Needs     Financial resource strain: Not on file     Food insecurity     Worry: Not on file     Inability: Not on file     Transportation needs     Medical: Not on file     Non-medical: Not on file   Tobacco Use     Smoking status: Never Smoker     Smokeless tobacco: Never Used   Substance and Sexual Activity     Alcohol use: Not on file     Drug use: Not on file     Sexual activity: Not on file   Lifestyle     Physical activity     Days per week: Not on file     Minutes per session: Not on file     Stress: Not on file   Relationships     Social connections     Talks on phone: Not on file     Gets together: Not on file     Attends Mormonism service: Not on file     Active member of club or organization: Not on file     Attends meetings of clubs or organizations: Not on file     Relationship status: Not on file     Intimate partner violence     Fear of current or ex partner: Not on file     Emotionally abused: Not on file     Physically abused: Not on file     Forced sexual activity: Not on file   Other Topics  Concern     Not on file   Social History Narrative     Not on file       Outpatient Encounter Medications as of 12/15/2020   Medication Sig Dispense Refill     brimonidine (ALPHAGAN) 0.2 % ophthalmic solution instill 1 drop IN EACH EYE TWICE DAILY  5     calcipotriene (DOVONEX) 0.005 % external cream Mix with efudex and apply twice daily to face for ten days (Patient not taking: Reported on 6/23/2020) 60 g 3     fluocin-hydroquinone-tretinoin 0.01-4-0.05 % CREA Apply at bedtime (Patient not taking: Reported on 12/12/2017) 60 g 11     fluorouracil (EFUDEX) 5 % cream Apply twice daily for two weeks to face (Patient not taking: Reported on 6/18/2019) 40 g 3     fluorouracil (EFUDEX) 5 % external cream Mix with dovonex and apply twice daily to face for ten days 40 g 1     latanoprost (XALATAN) 0.005 % ophthalmic solution instill 1 drop into IN EACH EYE EVERY NIGHT AT BEDTIME  5     metroNIDAZOLE (METROGEL) 1 % gel Apply topically daily 60 g 11     minocycline (MINOCIN) 100 MG capsule Take 1 capsule (100 mg) by mouth 2 times daily (with meals) 60 capsule 1     timolol (TIMOPTIC) 0.5 % ophthalmic solution instill 1 drop IN EACH EYE TWICE DAILY  5     No facility-administered encounter medications on file as of 12/15/2020.              Review Of Systems  Skin: As above  Eyes: negative  Ears/Nose/Throat: negative  Respiratory: No shortness of breath, dyspnea on exertion, cough, or hemoptysis  Cardiovascular: negative  Gastrointestinal: negative  Genitourinary: negative  Musculoskeletal: negative  Neurologic: negative  Psychiatric: negative  Hematologic/Lymphatic/Immunologic: negative  Endocrine: negative      O:   NAD, WDWN, Alert & Oriented, Mood & Affect wnl, Vitals stable   Here today alone   BP (!) 161/81   Pulse 53   SpO2 99%    General appearance normal   Vitals stable   Alert, oriented and in no acute distress      Following lymph nodes palpated: Occipital, Cervical, Supraclavicular no lad   Stuck on appule on  scalp   Dusky pigmentation on ears and cheeks     Stuck on papules and brown macules on trunk and ext   Red papules on trunk  Flesh colored papules on trunk     The remainder of the full exam was normal; the following areas were examined:  conjunctiva/lids, oral mucosa, neck, peripheral vascular system, abdomen, lymph nodes, digits/nails, eccrine and apocrine glands, scalp/hair, face, neck, chest, abdomen, buttocks, back, RUE, LUE, RLE, LLE       Eyes: Conjunctivae/lids:Normal     ENT: Lips, buccal mucosa, tongue: normal    MSK:Normal    Cardiovascular: peripheral edema none    Pulm: Breathing Normal    Lymph Nodes: No Head and Neck Lymphadenopathy     Neuro/Psych: Orientation:Alert and Orientedx3 ; Mood/Affect:normal       A/P:  1. Seborrheic keratosis, lentigo, angioma, dermal nevus  2. Poikiloderma he denies any hx of minocycline use, htn med use or silver use  Otherwise healthy      It was a pleasure speaking to Jean Hernandez today.  BENIGN LESIONS DISCUSSED WITH PATIENT:  I discussed the specifics of tumor, prognosis, and genetics of benign lesions.  I explained that treatment of these lesions would be purely cosmetic and not medically neccessary.  I discussed with patient different removal options including excision, cautery and /or laser.      Nature and genetics of benign skin lesions dicussed with patient.  Signs and Symptoms of skin cancer discussed with patient.  Patient encouraged to perform monthly skin exams.  UV precautions reviewed with patient.  Skin care regimen reviewed with patient: Eliminate harsh soaps, i.e. Dial, zest, irsih spring; Mild soaps such as Cetaphil or Dove sensitive skin, avoid hot or cold showers, aggressive use of emollients including vanicream, cetaphil or cerave discussed with patient.    Risks of non-melanoma skin cancer discussed with patient   Return to clinic 12 months

## 2020-12-15 NOTE — PATIENT INSTRUCTIONS
Proper skin care from Dr. Tucker- [facility]     Eliminate harsh soaps, i.e. Dial, Zest, Aleisha Spring;   Use mild soaps such as Cetaphil or Dove Sensitive Skin   Avoid hot or cold showers   After showering, lightly dry off.    Aggressive use of a moisturizer (including Vanicream, Cetaphil, Aquaphor or Cerave)   We recommend using a tub that needs to be scooped out, not a pump. This has more of an oil base. It will hold moisture in your skin much better than a water base moisturizer. The ones recommended are non- pore clogging.       If you have any questions call [phone] and follow the prompts to Dr. Tucker's office.

## 2020-12-15 NOTE — LETTER
12/15/2020         RE: Jean Hernandez  [id number] [facility] [address]        Dear Colleague,    Thank you for referring your patient, Jean Hernandez, to the [facility]. Please see a copy of my visit note below.    Jean Hernandez is an extremely pleasant 75 year old year old male patient here today for spot on scalp.   .  Patient states this has been present for a while.  Patient reports the following symptoms:  rough.  Patient reports the following previous treatments none.  These treatments did not work.  Patient reports the following modifying factors none.  Associated symptoms: none.  Patient has no other skin complaints today.  Remainder of the HPI, Meds, PMH, Allergies, FH, and SH was reviewed in chart.    No past medical history on file.    No past surgical history on file.     Family History   Problem Relation Age of Onset     Melanoma No family hx of        Social History     Socioeconomic History     Marital status:      Spouse name: Not on file     Number of children: Not on file     Years of education: Not on file     Highest education level: Not on file   Occupational History     Not on file   Social Needs     Financial resource strain: Not on file     Food insecurity     Worry: Not on file     Inability: Not on file     Transportation needs     Medical: Not on file     Non-medical: Not on file   Tobacco Use     Smoking status: Never Smoker     Smokeless tobacco: Never Used   Substance and Sexual Activity     Alcohol use: Not on file     Drug use: Not on file     Sexual activity: Not on file   Lifestyle     Physical activity     Days per week: Not on file     Minutes per session: Not on file     Stress: Not on file   Relationships     Social connections     Talks on phone: Not on file     Gets together: Not on file     Attends Denominational service: Not on file     Active member of club or organization: Not on file     Attends meetings of clubs or organizations: Not on  file     Relationship status: Not on file     Intimate partner violence     Fear of current or ex partner: Not on file     Emotionally abused: Not on file     Physically abused: Not on file     Forced sexual activity: Not on file   Other Topics Concern     Not on file   Social History Narrative     Not on file       Outpatient Encounter Medications as of 12/15/2020   Medication Sig Dispense Refill     brimonidine (ALPHAGAN) 0.2 % ophthalmic solution instill 1 drop IN EACH EYE TWICE DAILY  5     calcipotriene (DOVONEX) 0.005 % external cream Mix with efudex and apply twice daily to face for ten days (Patient not taking: Reported on 6/23/2020) 60 g 3     fluocin-hydroquinone-tretinoin 0.01-4-0.05 % CREA Apply at bedtime (Patient not taking: Reported on 12/12/2017) 60 g 11     fluorouracil (EFUDEX) 5 % cream Apply twice daily for two weeks to face (Patient not taking: Reported on 6/18/2019) 40 g 3     fluorouracil (EFUDEX) 5 % external cream Mix with dovonex and apply twice daily to face for ten days 40 g 1     latanoprost (XALATAN) 0.005 % ophthalmic solution instill 1 drop into IN EACH EYE EVERY NIGHT AT BEDTIME  5     metroNIDAZOLE (METROGEL) 1 % gel Apply topically daily 60 g 11     minocycline (MINOCIN) 100 MG capsule Take 1 capsule (100 mg) by mouth 2 times daily (with meals) 60 capsule 1     timolol (TIMOPTIC) 0.5 % ophthalmic solution instill 1 drop IN EACH EYE TWICE DAILY  5     No facility-administered encounter medications on file as of 12/15/2020.              Review Of Systems  Skin: As above  Eyes: negative  Ears/Nose/Throat: negative  Respiratory: No shortness of breath, dyspnea on exertion, cough, or hemoptysis  Cardiovascular: negative  Gastrointestinal: negative  Genitourinary: negative  Musculoskeletal: negative  Neurologic: negative  Psychiatric: negative  Hematologic/Lymphatic/Immunologic: negative  Endocrine: negative      O:   NAD, WDWN, Alert & Oriented, Mood & Affect wnl, Vitals stable   Here  today alone   BP (!) 161/81   Pulse 53   SpO2 99%    General appearance normal   Vitals stable   Alert, oriented and in no acute distress      Following lymph nodes palpated: Occipital, Cervical, Supraclavicular no lad   Stuck on appule on scalp   Dusky pigmentation on ears and cheeks     Stuck on papules and brown macules on trunk and ext   Red papules on trunk  Flesh colored papules on trunk     The remainder of the full exam was normal; the following areas were examined:  conjunctiva/lids, oral mucosa, neck, peripheral vascular system, abdomen, lymph nodes, digits/nails, eccrine and apocrine glands, scalp/hair, face, neck, chest, abdomen, buttocks, back, RUE, LUE, RLE, LLE       Eyes: Conjunctivae/lids:Normal     ENT: Lips, buccal mucosa, tongue: normal    MSK:Normal    Cardiovascular: peripheral edema none    Pulm: Breathing Normal    Lymph Nodes: No Head and Neck Lymphadenopathy     Neuro/Psych: Orientation:Alert and Orientedx3 ; Mood/Affect:normal       A/P:  1. Seborrheic keratosis, lentigo, angioma, dermal nevus  2. 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  BENIGN LESIONS DISCUSSED WITH PATIENT:  I discussed the specifics of tumor, prognosis, and genetics of benign lesions.  I explained that treatment of these lesions would be purely cosmetic and not medically neccessary.  I discussed with patient different removal options including excision, cautery and /or laser.      Nature and genetics of benign skin lesions dicussed with patient.  Signs and Symptoms of skin cancer discussed with patient.  Patient encouraged to perform monthly skin exams.  UV precautions reviewed with patient.  Skin care regimen reviewed with patient: Eliminate harsh soaps, i.e. Dial, zest, irsih spring; Mild soaps such as Cetaphil or Dove sensitive skin, avoid hot or cold showers, aggressive use of emollients including  vanicream, cetaphil or cerave discussed with patient.    Risks of non-melanoma skin cancer discussed with patient   Return to clinic 12 months        Again, thank you for allowing me to participate in the care of your patient.        Sincerely,        Balta Tcuker MD

## 2021-01-13 DIAGNOSIS — L71.9 ROSACEA: ICD-10-CM

## 2021-01-13 NOTE — TELEPHONE ENCOUNTER
"Requested Prescriptions   Pending Prescriptions Disp Refills     minocycline (MINOCIN) 100 MG capsule 60 capsule 1     Sig: Take 1 capsule (100 mg) by mouth 2 times daily (with meals)       Oral Acne/Rosacea Medications Protocol Failed - 1/13/2021  9:01 AM        Failed - Confirmation of diagnosis is required     Please confirm diagnosis is acne or rosacea.     If NOT acne or rosacea; refer request to provider for further evaluation.    If diagnosis IS acne or rosacea, OK to refill BASED ON PREVIOUS REFILL CLINICAL NOTE RECOMMENDATION.          Passed - Patient is 12 years of age or older        Passed - Recent (12 mo) or future (30 days) visit within the authorizing provider's specialty     Patient has had an office visit with the authorizing provider or a provider within the authorizing providers department within the previous 12 mos or has a future within next 30 days. See \"Patient Info\" tab in inbasket, or \"Choose Columns\" in Meds & Orders section of the refill encounter.              Passed - Medication is active on med list             "

## 2021-01-14 RX ORDER — MINOCYCLINE HYDROCHLORIDE 100 MG/1
100 CAPSULE ORAL 2 TIMES DAILY WITH MEALS
Qty: 90 CAPSULE | Refills: 1 | Status: SHIPPED | OUTPATIENT
Start: 2021-01-14 | End: 2021-07-09

## 2021-07-07 DIAGNOSIS — L71.9 ROSACEA: ICD-10-CM

## 2021-07-07 NOTE — TELEPHONE ENCOUNTER
Pt states pharmacy has tried to send multiple requests. Please contact if there are further questions/concerns.    Ashley Donato on 7/7/2021 at 8:55 AM

## 2021-07-09 RX ORDER — MINOCYCLINE HYDROCHLORIDE 100 MG/1
100 CAPSULE ORAL 2 TIMES DAILY WITH MEALS
Qty: 90 CAPSULE | Refills: 1 | Status: SHIPPED | OUTPATIENT
Start: 2021-07-09 | End: 2021-10-01

## 2021-09-30 DIAGNOSIS — L71.9 ROSACEA: ICD-10-CM

## 2021-09-30 NOTE — TELEPHONE ENCOUNTER
Pending Prescriptions:                       Disp   Refills    minocycline (MINOCIN) 100 MG capsule       90 cap*1        Sig: Take 1 capsule (100 mg) by mouth 2 times daily (with           meals)    Routing refill request to provider for review/approval because:  Confirmation of diagnosis is required.    Pita Vaughn RN

## 2021-10-01 RX ORDER — MINOCYCLINE HYDROCHLORIDE 100 MG/1
100 CAPSULE ORAL 2 TIMES DAILY WITH MEALS
Qty: 90 CAPSULE | Refills: 1 | Status: SHIPPED | OUTPATIENT
Start: 2021-10-01 | End: 2022-03-28

## 2021-12-21 ENCOUNTER — OFFICE VISIT (OUTPATIENT)
Dept: DERMATOLOGY | Facility: CLINIC | Age: 76
End: 2021-12-21
Payer: COMMERCIAL

## 2021-12-21 VITALS — OXYGEN SATURATION: 99 % | HEIGHT: 73 IN | HEART RATE: 53 BPM

## 2021-12-21 DIAGNOSIS — D23.9 DERMAL NEVUS: ICD-10-CM

## 2021-12-21 DIAGNOSIS — Z85.828 HISTORY OF SKIN CANCER: Primary | ICD-10-CM

## 2021-12-21 DIAGNOSIS — L81.9 SKIN PIGMENTATION DISORDER: ICD-10-CM

## 2021-12-21 DIAGNOSIS — L57.0 AK (ACTINIC KERATOSIS): ICD-10-CM

## 2021-12-21 DIAGNOSIS — L81.4 LENTIGO: ICD-10-CM

## 2021-12-21 DIAGNOSIS — L82.1 SEBORRHEIC KERATOSIS: ICD-10-CM

## 2021-12-21 DIAGNOSIS — D18.01 ANGIOMA OF SKIN: ICD-10-CM

## 2021-12-21 PROCEDURE — 17000 DESTRUCT PREMALG LESION: CPT | Performed by: DERMATOLOGY

## 2021-12-21 PROCEDURE — 17003 DESTRUCT PREMALG LES 2-14: CPT | Performed by: DERMATOLOGY

## 2021-12-21 PROCEDURE — 99213 OFFICE O/P EST LOW 20 MIN: CPT | Mod: 25 | Performed by: DERMATOLOGY

## 2021-12-21 NOTE — PATIENT INSTRUCTIONS
WOUND CARE INSTRUCTIONS   FOR CRYOSURGERY   face  This area treated with liquid nitrogen should form a blister (areas treated may or may not blister-skin may just turn dark and slough off). You do not need to bandage the area unless a blister forms and breaks (which may be a few days). When the blister breaks, begin daily dressing changes as follows:  1) Clean and dry the area with tap water using clean Q-tip or sterile gauze pad.   2) Apply Polysporin ointment or Bacitracin ointment over entire wound. Do NOT use Neosporin ointment.   3) Cover the wound with a band-aid or sterile non-stick gauze pad and micropore paper tape.   REPEAT THESE INSTRUCTIONS AT LEAST ONCE A DAY UNTIL THE WOUND HAS COMPLETELY HEALED.   It is an old wives tale that a wound heals better when it is exposed to air and allowed to dry out. The wound will heal faster with a better cosmetic result if it is kept moist with ointment and covered with a bandage.   Do not let the wound dry out.   IMPORTANT INFORMATION ON REVERSE SIDE   Supplies Needed:   *Cotton tipped applicators (Q-tips)   *Polysporin ointment or Bacitracin ointment (NOT NEOSPORIN)   *Band-aids, or non stick gauze pads and micropore paper tape   PATIENT INFORMATION   During the healing process you will notice a number of changes. All wounds develop a small halo of redness surrounding the wound. This means healing is occurring. Severe itching with extensive redness usually indicates sensitivity to the ointment or bandage tape used to dress the wound. You should call our office if this develops.   Swelling and/or discoloration around your surgical site is common, particularly when performed around the eye.   All wounds normally drain. The larger the wound the more drainage there will be. After 7-10 days, you will notice the wound beginning to shrink and new skin will begin to grow. The wound is healed when you can see skin has formed over the entire area. A healed wound has a healthy,  shiny look to the surface and is red to dark pink in color to normalize. Wounds may take approximately 4-6 weeks to heal. Larger wounds may take 6-8 weeks. After the wound is healed you may discontinue dressing changes.   You may experience a sensation of tightness as your wound heals. This is normal and will gradually subside.   Your healed wound may be sensitive to temperature changes. This sensitivity improves with time, but if you re having a lot of discomfort, try to avoid temperature extremes.   Patients frequently experience itching after their wound appears to have healed because of the continue healing under the skin. Plain Vaseline will help relieve the itching.

## 2021-12-21 NOTE — PROGRESS NOTES
Jean Hernandez is an extremely pleasant 76 year old year old male patient here today for spot on right temple.   .   Patient states this has been present for a while.  Patient reports the following symptoms:  rough.  Patient reports the following previous treatments none.  These treatments did not work.  Patient reports the following modifying factors none.  Associated symptoms: none.  Patient has no other skin complaints today.  Remainder of the HPI, Meds, PMH, Allergies, FH, and SH was reviewed in chart.    No past medical history on file.    No past surgical history on file.     Family History   Problem Relation Age of Onset     Melanoma No family hx of        Social History     Socioeconomic History     Marital status:      Spouse name: Not on file     Number of children: Not on file     Years of education: Not on file     Highest education level: Not on file   Occupational History     Not on file   Tobacco Use     Smoking status: Never Smoker     Smokeless tobacco: Never Used   Substance and Sexual Activity     Alcohol use: Not on file     Drug use: Not on file     Sexual activity: Not on file   Other Topics Concern     Not on file   Social History Narrative     Not on file     Social Determinants of Health     Financial Resource Strain: Not on file   Food Insecurity: Not on file   Transportation Needs: Not on file   Physical Activity: Not on file   Stress: Not on file   Social Connections: Not on file   Intimate Partner Violence: Not on file   Housing Stability: Not on file       Outpatient Encounter Medications as of 12/21/2021   Medication Sig Dispense Refill     brimonidine (ALPHAGAN) 0.2 % ophthalmic solution instill 1 drop IN EACH EYE TWICE DAILY  5     calcipotriene (DOVONEX) 0.005 % external cream Mix with efudex and apply twice daily to face for ten days (Patient not taking: Reported on 6/23/2020) 60 g 3     fluocin-hydroquinone-tretinoin 0.01-4-0.05 % CREA Apply at bedtime (Patient not taking:  "Reported on 12/12/2017) 60 g 11     fluorouracil (EFUDEX) 5 % cream Apply twice daily for two weeks to face (Patient not taking: Reported on 6/18/2019) 40 g 3     fluorouracil (EFUDEX) 5 % external cream Mix with dovonex and apply twice daily to face for ten days 40 g 1     latanoprost (XALATAN) 0.005 % ophthalmic solution instill 1 drop into IN EACH EYE EVERY NIGHT AT BEDTIME  5     metroNIDAZOLE (METROGEL) 1 % gel Apply topically daily 60 g 11     minocycline (MINOCIN) 100 MG capsule Take 1 capsule (100 mg) by mouth 2 times daily (with meals) 90 capsule 1     timolol (TIMOPTIC) 0.5 % ophthalmic solution instill 1 drop IN EACH EYE TWICE DAILY  5     No facility-administered encounter medications on file as of 12/21/2021.             O:   NAD, WDWN, Alert & Oriented, Mood & Affect wnl, Vitals stable   Here today alone  Pulse 53   Ht 1.854 m (6' 1\")   SpO2 99%    General appearance normal   Vitals stable   Alert, oriented and in no acute distress      Following lymph nodes palpated: Occipital, Cervical, Supraclavicular no lad  R temple x2, L forehead gritty scaly papule     Stuck on papules and brown macules on trunk and ext   Red papules on trunk  Flesh colored papules on trunk  borwn baclk patches on face     The remainder of expanded problem focused exam was normal; the following areas were examined:  scalp/hair, conjunctiva/lids, face, neck, lips, chest, digits/nails, RUE, LUE.      Eyes: Conjunctivae/lids:Normal     ENT: Lips, buccal mucosa, tongue: normal    MSK:Normal    Cardiovascular: peripheral edema none    Pulm: Breathing Normal    Lymph Nodes: No Head and Neck Lymphadenopathy     Neuro/Psych: Orientation:Alert and Orientedx3 ; Mood/Affect:normal       A/P:  1. Seborrheic keratosis, lentigo, angioma, dermal nevus, hx of non-melanoma skin cancer   2. Actinic keratosis   R temple x2, L forehead x1  LN2:  Treated with LN2 for 5s for 1-2 cycles. Warned risks of blistering, pain, pigment change, scarring, " and incomplete resolution.  Advised patient to return if lesions do not completely resolve.  Wound care sheet given.  3. Drug induced pigmentation  Laser discussed with patient he declines    It was a pleasure speaking to Jean Hernandez today.  Previous clinic notes and pertinent laboratory tests were reviewed prior to Jean Hernandez's visit.  Signs and Symptoms of skin cancer discussed with patient.  Patient encouraged to perform monthly skin exams.  UV precautions reviewed with patient.  Risks of non-melanoma skin cancer discussed with patient   Return to clinic 6 months

## 2021-12-21 NOTE — NURSING NOTE
"Chief Complaint   Patient presents with     Derm Problem       Vitals:    12/21/21 1027   Pulse: 53   SpO2: 99%   Height: 1.854 m (6' 1\")     Wt Readings from Last 1 Encounters:   No data found for Wt       Heydi Pina LPN.................12/21/2021    "

## 2021-12-21 NOTE — LETTER
12/21/2021         RE: Jean Hernandez  [id number] [facility] [address]        Dear Colleague,    Thank you for referring your patient, Jean Hernandez, to the [facility]. Please see a copy of my visit note below.    Jean Hernandez is an extremely pleasant 76 year old year old male patient here today for spot on right temple.   .   Patient states this has been present for a while.  Patient reports the following symptoms:  rough.  Patient reports the following previous treatments none.  These treatments did not work.  Patient reports the following modifying factors none.  Associated symptoms: none.  Patient has no other skin complaints today.  Remainder of the HPI, Meds, PMH, Allergies, FH, and SH was reviewed in chart.    No past medical history on file.    No past surgical history on file.     Family History   Problem Relation Age of Onset     Melanoma No family hx of        Social History     Socioeconomic History     Marital status:      Spouse name: Not on file     Number of children: Not on file     Years of education: Not on file     Highest education level: Not on file   Occupational History     Not on file   Tobacco Use     Smoking status: Never Smoker     Smokeless tobacco: Never Used   Substance and Sexual Activity     Alcohol use: Not on file     Drug use: Not on file     Sexual activity: Not on file   Other Topics Concern     Not on file   Social History Narrative     Not on file     Social Determinants of Health     Financial Resource Strain: Not on file   Food Insecurity: Not on file   Transportation Needs: Not on file   Physical Activity: Not on file   Stress: Not on file   Social Connections: Not on file   Intimate Partner Violence: Not on file   Housing Stability: Not on file       Outpatient Encounter Medications as of 12/21/2021   Medication Sig Dispense Refill     brimonidine (ALPHAGAN) 0.2 % ophthalmic solution instill 1 drop IN EACH EYE TWICE DAILY  5      "calcipotriene (DOVONEX) 0.005 % external cream Mix with efudex and apply twice daily to face for ten days (Patient not taking: Reported on 6/23/2020) 60 g 3     fluocin-hydroquinone-tretinoin 0.01-4-0.05 % CREA Apply at bedtime (Patient not taking: Reported on 12/12/2017) 60 g 11     fluorouracil (EFUDEX) 5 % cream Apply twice daily for two weeks to face (Patient not taking: Reported on 6/18/2019) 40 g 3     fluorouracil (EFUDEX) 5 % external cream Mix with dovonex and apply twice daily to face for ten days 40 g 1     latanoprost (XALATAN) 0.005 % ophthalmic solution instill 1 drop into IN EACH EYE EVERY NIGHT AT BEDTIME  5     metroNIDAZOLE (METROGEL) 1 % gel Apply topically daily 60 g 11     minocycline (MINOCIN) 100 MG capsule Take 1 capsule (100 mg) by mouth 2 times daily (with meals) 90 capsule 1     timolol (TIMOPTIC) 0.5 % ophthalmic solution instill 1 drop IN EACH EYE TWICE DAILY  5     No facility-administered encounter medications on file as of 12/21/2021.             O:   NAD, WDWN, Alert & Oriented, Mood & Affect wnl, Vitals stable   Here today alone  Pulse 53   Ht 1.854 m (6' 1\")   SpO2 99%    General appearance normal   Vitals stable   Alert, oriented and in no acute distress      Following lymph nodes palpated: Occipital, Cervical, Supraclavicular no lad  R temple x2, L forehead gritty scaly papule     Stuck on papules and brown macules on trunk and ext   Red papules on trunk  Flesh colored papules on trunk  borwn baclk patches on face     The remainder of expanded problem focused exam was normal; the following areas were examined:  scalp/hair, conjunctiva/lids, face, neck, lips, chest, digits/nails, RUE, LUE.      Eyes: Conjunctivae/lids:Normal     ENT: Lips, buccal mucosa, tongue: normal    MSK:Normal    Cardiovascular: peripheral edema none    Pulm: Breathing Normal    Lymph Nodes: No Head and Neck Lymphadenopathy     Neuro/Psych: Orientation:Alert and Orientedx3 ; Mood/Affect:normal "       A/P:  1. Seborrheic keratosis, lentigo, angioma, dermal nevus, hx of non-melanoma skin cancer   2. Actinic keratosis   R temple x2, L forehead x1  LN2:  Treated with LN2 for 5s for 1-2 cycles. Warned risks of blistering, pain, pigment change, scarring, and incomplete resolution.  Advised patient to return if lesions do not completely resolve.  Wound care sheet given.  3. Drug induced pigmentation  Laser discussed with patient he declines    It was a pleasure speaking to Jean Hernandez today.  Previous clinic notes and pertinent laboratory tests were reviewed prior to Jean Hernandez's visit.  Signs and Symptoms of skin cancer discussed with patient.  Patient encouraged to perform monthly skin exams.  UV precautions reviewed with patient.  Risks of non-melanoma skin cancer discussed with patient   Return to clinic 6 months        Again, thank you for allowing me to participate in the care of your patient.        Sincerely,        Balta Tucker MD

## 2022-03-21 DIAGNOSIS — L71.9 ROSACEA: ICD-10-CM

## 2022-03-24 NOTE — TELEPHONE ENCOUNTER
"Requested Prescriptions   Pending Prescriptions Disp Refills    minocycline (MINOCIN) 100 MG capsule 90 capsule 1     Sig: Take 1 capsule (100 mg) by mouth 2 times daily (with meals)        Oral Acne/Rosacea Medications Protocol Failed - 3/21/2022  1:24 PM        Failed - Recent (12 mo) or future (30 days) visit within the authorizing provider's specialty     Patient has had an office visit with the authorizing provider or a provider within the authorizing providers department within the previous 12 mos or has a future within next 30 days. See \"Patient Info\" tab in inbasket, or \"Choose Columns\" in Meds & Orders section of the refill encounter.              Failed - Confirmation of diagnosis is required     Please confirm diagnosis is acne or rosacea.     If NOT acne or rosacea; refer request to provider for further evaluation.    If diagnosis IS acne or rosacea, OK to refill BASED ON PREVIOUS REFILL CLINICAL NOTE RECOMMENDATION.            Passed - Patient is 12 years of age or older        Passed - Medication is active on med list              "

## 2022-03-28 RX ORDER — MINOCYCLINE HYDROCHLORIDE 100 MG/1
100 CAPSULE ORAL 2 TIMES DAILY WITH MEALS
Qty: 90 CAPSULE | Refills: 1 | Status: SHIPPED | OUTPATIENT
Start: 2022-03-28 | End: 2022-06-22

## 2022-06-21 DIAGNOSIS — L71.9 ROSACEA: ICD-10-CM

## 2022-06-21 NOTE — LETTER
[facility] [place]  [address] [address]  Phone: [phone]    June 23, 2022    Jean Hernandez                                                                                                                 [address] [address] [facility] [address]            Dear Mr. Hernandez,    We recently provided you with a medication refill. Prescription medications require routine follow-up appointments with your Dermatology Provider.      At this time we ask that: You schedule a routine follow up office visit with your Dermatology Provider to follow your Pre-cancerous Actinic keratosis. Per 12- Dermatology dictation, you were to return to clinic in 6 months for a routine repeat skin check appointment.     Your prescription: Has been refilled.    Please schedule Dermatology appointment soon as we are currently booking Dermatology appointments into October and tend to book 10 to 14 weeks in advance year round.    Thank you,      Balta Tucker MD/ [facility]

## 2022-06-22 NOTE — TELEPHONE ENCOUNTER
Due now for recheck Derm appt per 12-21-21 derm dictation.     Patient no showed 6-15-22 scheduled Derm appt and cancelled 6-22-22 Derm appt.     Sofia Alberts RN

## 2022-06-23 RX ORDER — MINOCYCLINE HYDROCHLORIDE 100 MG/1
100 CAPSULE ORAL 2 TIMES DAILY WITH MEALS
Qty: 90 CAPSULE | Refills: 0 | Status: SHIPPED | OUTPATIENT
Start: 2022-06-23 | End: 2022-07-19

## 2022-06-23 NOTE — TELEPHONE ENCOUNTER
Refilled Rosacea med and Letter sent to notify of need for follow up Derm appt. Sofia Alberts RN

## 2022-07-19 ENCOUNTER — OFFICE VISIT (OUTPATIENT)
Dept: DERMATOLOGY | Facility: CLINIC | Age: 77
End: 2022-07-19
Payer: COMMERCIAL

## 2022-07-19 DIAGNOSIS — T36.4X5A MINOCYCLINE PIGMENTATION: ICD-10-CM

## 2022-07-19 DIAGNOSIS — L71.9 ROSACEA: ICD-10-CM

## 2022-07-19 DIAGNOSIS — L82.1 SEBORRHEIC KERATOSIS: ICD-10-CM

## 2022-07-19 DIAGNOSIS — D23.9 DERMAL NEVUS: ICD-10-CM

## 2022-07-19 DIAGNOSIS — D18.01 ANGIOMA OF SKIN: ICD-10-CM

## 2022-07-19 DIAGNOSIS — L81.9 MINOCYCLINE PIGMENTATION: ICD-10-CM

## 2022-07-19 DIAGNOSIS — L81.4 LENTIGO: Primary | ICD-10-CM

## 2022-07-19 DIAGNOSIS — L57.0 AK (ACTINIC KERATOSIS): ICD-10-CM

## 2022-07-19 PROCEDURE — 99213 OFFICE O/P EST LOW 20 MIN: CPT | Mod: 25 | Performed by: DERMATOLOGY

## 2022-07-19 PROCEDURE — 17003 DESTRUCT PREMALG LES 2-14: CPT | Performed by: DERMATOLOGY

## 2022-07-19 PROCEDURE — 17000 DESTRUCT PREMALG LESION: CPT | Performed by: DERMATOLOGY

## 2022-07-19 RX ORDER — DOXYCYCLINE 50 MG/1
50 CAPSULE ORAL DAILY
Qty: 60 CAPSULE | Refills: 3 | Status: SHIPPED | OUTPATIENT
Start: 2022-07-19 | End: 2023-04-07

## 2022-07-19 ASSESSMENT — PAIN SCALES - GENERAL: PAINLEVEL: NO PAIN (0)

## 2022-07-19 NOTE — PROGRESS NOTES
Jean Hernandez is an extremely pleasant 76 year old year old male patient here today for rough spots on face and spot on back.   .   Patient states this has been present for a while.  Patient reports the following symptoms:  rough.  Patient reports the following previous treatments none.  These treatments did not work.  Patient reports the following modifying factors none.  Associated symptoms: none.  He notes hx of rosacea still taking minocycline.  .  Patient has no other skin complaints today.  Remainder of the HPI, Meds, PMH, Allergies, FH, and SH was reviewed in chart.    History reviewed. No pertinent past medical history.    History reviewed. No pertinent surgical history.     Family History   Problem Relation Age of Onset     Melanoma No family hx of        Social History     Socioeconomic History     Marital status:      Spouse name: Not on file     Number of children: Not on file     Years of education: Not on file     Highest education level: Not on file   Occupational History     Not on file   Tobacco Use     Smoking status: Never Smoker     Smokeless tobacco: Never Used   Substance and Sexual Activity     Alcohol use: Not on file     Drug use: Not on file     Sexual activity: Not on file   Other Topics Concern     Not on file   Social History Narrative     Not on file     Social Determinants of Health     Financial Resource Strain: Not on file   Food Insecurity: Not on file   Transportation Needs: Not on file   Physical Activity: Not on file   Stress: Not on file   Social Connections: Not on file   Intimate Partner Violence: Not on file   Housing Stability: Not on file       Outpatient Encounter Medications as of 7/19/2022   Medication Sig Dispense Refill     brimonidine (ALPHAGAN) 0.2 % ophthalmic solution instill 1 drop IN EACH EYE TWICE DAILY  5     latanoprost (XALATAN) 0.005 % ophthalmic solution instill 1 drop into IN EACH EYE EVERY NIGHT AT BEDTIME  5     timolol (TIMOPTIC) 0.5 % ophthalmic  solution instill 1 drop IN EACH EYE TWICE DAILY  5     [DISCONTINUED] calcipotriene (DOVONEX) 0.005 % external cream Mix with efudex and apply twice daily to face for ten days (Patient not taking: No sig reported) 60 g 3     [DISCONTINUED] fluocin-hydroquinone-tretinoin 0.01-4-0.05 % CREA Apply at bedtime (Patient not taking: No sig reported) 60 g 11     [DISCONTINUED] fluorouracil (EFUDEX) 5 % cream Apply twice daily for two weeks to face (Patient not taking: No sig reported) 40 g 3     [DISCONTINUED] fluorouracil (EFUDEX) 5 % external cream Mix with dovonex and apply twice daily to face for ten days (Patient not taking: Reported on 12/21/2021) 40 g 1     [DISCONTINUED] metroNIDAZOLE (METROGEL) 1 % gel Apply topically daily (Patient not taking: No sig reported) 60 g 11     [DISCONTINUED] minocycline (MINOCIN) 100 MG capsule Take 1 capsule (100 mg) by mouth 2 times daily (with meals) 90 capsule 0     No facility-administered encounter medications on file as of 7/19/2022.             O:   NAD, WDWN, Alert & Oriented, Mood & Affect wnl, Vitals stable   Here today alone   General appearance normal   Vitals stable   Alert, oriented and in no acute distress      Following lymph nodes palpated: Occipital, Cervical, Supraclavicular no lad  BAck stuck on papule  Gritty papules on face  Hyperpigmentation on face     Stuck on papules and brown macules on trunk and ext   Red papules on trunk  Flesh colored papules on trunk         Eyes: Conjunctivae/lids:Normal     ENT: Lips, buccal mucosa, tongue: normal    MSK:Normal    Cardiovascular: peripheral edema none    Pulm: Breathing Normal    Lymph Nodes: No Head and Neck Lymphadenopathy     Neuro/Psych: Orientation:Alert and Orientedx3 ; Mood/Affect:normal       A/P:  1. Seborrheic keratosis, lentigo, angioma, dermal nevus  2. Actinic keratosis   Brow x1, R temple x4  LN2:  Treated with LN2 for 5s for 1-2 cycles. Warned risks of blistering, pain, pigment change, scarring, and  incomplete resolution.  Advised patient to return if lesions do not completely resolve.  Wound care sheet given.  3. Minocycline hyerppigmentation  discharge minocycline discussed with patient   Switch to doxy   Return to clinic 3 months  Lasers discussed with patient   It was a pleasure speaking to Jean Hernandez today.  Previous clinic notes and pertinent laboratory tests were reviewed prior to Jean Hernandez's visit.  Nature of benign skin lesions dicussed with patient.  Patient encouraged to perform monthly skin exams.  UV precautions reviewed with patient.  Return to clinic 3 months

## 2022-07-19 NOTE — LETTER
7/19/2022         RE: Jean Hernandez  [id number] [address] [facility] [id number]        Dear Colleague,    Thank you for referring your patient, Jean Hernandez, to the [facility]. Please see a copy of my visit note below.    Jean Hernandez is an extremely pleasant 76 year old year old male patient here today for rough spots on face and spot on back.   .   Patient states this has been present for a while.  Patient reports the following symptoms:  rough.  Patient reports the following previous treatments none.  These treatments did not work.  Patient reports the following modifying factors none.  Associated symptoms: none.  He notes hx of rosacea still taking minocycline.  .  Patient has no other skin complaints today.  Remainder of the HPI, Meds, PMH, Allergies, FH, and SH was reviewed in chart.    History reviewed. No pertinent past medical history.    History reviewed. No pertinent surgical history.     Family History   Problem Relation Age of Onset     Melanoma No family hx of        Social History     Socioeconomic History     Marital status:      Spouse name: Not on file     Number of children: Not on file     Years of education: Not on file     Highest education level: Not on file   Occupational History     Not on file   Tobacco Use     Smoking status: Never Smoker     Smokeless tobacco: Never Used   Substance and Sexual Activity     Alcohol use: Not on file     Drug use: Not on file     Sexual activity: Not on file   Other Topics Concern     Not on file   Social History Narrative     Not on file     Social Determinants of Health     Financial Resource Strain: Not on file   Food Insecurity: Not on file   Transportation Needs: Not on file   Physical Activity: Not on file   Stress: Not on file   Social Connections: Not on file   Intimate Partner Violence: Not on file   Housing Stability: Not on file       Outpatient Encounter Medications as of 7/19/2022   Medication Sig Dispense Refill      brimonidine (ALPHAGAN) 0.2 % ophthalmic solution instill 1 drop IN EACH EYE TWICE DAILY  5     latanoprost (XALATAN) 0.005 % ophthalmic solution instill 1 drop into IN EACH EYE EVERY NIGHT AT BEDTIME  5     timolol (TIMOPTIC) 0.5 % ophthalmic solution instill 1 drop IN EACH EYE TWICE DAILY  5     [DISCONTINUED] calcipotriene (DOVONEX) 0.005 % external cream Mix with efudex and apply twice daily to face for ten days (Patient not taking: No sig reported) 60 g 3     [DISCONTINUED] fluocin-hydroquinone-tretinoin 0.01-4-0.05 % CREA Apply at bedtime (Patient not taking: No sig reported) 60 g 11     [DISCONTINUED] fluorouracil (EFUDEX) 5 % cream Apply twice daily for two weeks to face (Patient not taking: No sig reported) 40 g 3     [DISCONTINUED] fluorouracil (EFUDEX) 5 % external cream Mix with dovonex and apply twice daily to face for ten days (Patient not taking: Reported on 12/21/2021) 40 g 1     [DISCONTINUED] metroNIDAZOLE (METROGEL) 1 % gel Apply topically daily (Patient not taking: No sig reported) 60 g 11     [DISCONTINUED] minocycline (MINOCIN) 100 MG capsule Take 1 capsule (100 mg) by mouth 2 times daily (with meals) 90 capsule 0     No facility-administered encounter medications on file as of 7/19/2022.             O:   NAD, WDWN, Alert & Oriented, Mood & Affect wnl, Vitals stable   Here today alone   General appearance normal   Vitals stable   Alert, oriented and in no acute distress      Following lymph nodes palpated: Occipital, Cervical, Supraclavicular no lad  BAck stuck on papule  Gritty papules on face  Hyperpigmentation on face     Stuck on papules and brown macules on trunk and ext   Red papules on trunk  Flesh colored papules on trunk         Eyes: Conjunctivae/lids:Normal     ENT: Lips, buccal mucosa, tongue: normal    MSK:Normal    Cardiovascular: peripheral edema none    Pulm: Breathing Normal    Lymph Nodes: No Head and Neck Lymphadenopathy     Neuro/Psych: Orientation:Alert and Orientedx3 ;  Mood/Affect:normal       A/P:  1. Seborrheic keratosis, lentigo, angioma, dermal nevus  2. Actinic keratosis   Brow x1, R temple x4  LN2:  Treated with LN2 for 5s for 1-2 cycles. Warned risks of blistering, pain, pigment change, scarring, and incomplete resolution.  Advised patient to return if lesions do not completely resolve.  Wound care sheet given.  3. Minocycline hyerppigmentation  discharge minocycline discussed with patient   Switch to doxy   Return to clinic 3 months  Lasers discussed with patient   It was a pleasure speaking to Jean Hernandez today.  Previous clinic notes and pertinent laboratory tests were reviewed prior to Jean Hernandez's visit.  Nature of benign skin lesions dicussed with patient.  Patient encouraged to perform monthly skin exams.  UV precautions reviewed with patient.  Return to clinic 3 months        Again, thank you for allowing me to participate in the care of your patient.        Sincerely,        Balta Tucker MD

## 2023-04-07 DIAGNOSIS — D18.01 ANGIOMA OF SKIN: ICD-10-CM

## 2023-04-07 DIAGNOSIS — L71.9 ROSACEA: ICD-10-CM

## 2023-04-07 DIAGNOSIS — L82.1 SEBORRHEIC KERATOSIS: ICD-10-CM

## 2023-04-07 DIAGNOSIS — D23.9 DERMAL NEVUS: ICD-10-CM

## 2023-04-07 DIAGNOSIS — L81.4 LENTIGO: ICD-10-CM

## 2023-04-07 DIAGNOSIS — L81.9 MINOCYCLINE PIGMENTATION: ICD-10-CM

## 2023-04-07 DIAGNOSIS — T36.4X5A MINOCYCLINE PIGMENTATION: ICD-10-CM

## 2023-04-07 DIAGNOSIS — L57.0 AK (ACTINIC KERATOSIS): ICD-10-CM

## 2023-04-07 RX ORDER — DOXYCYCLINE 50 MG/1
50 CAPSULE ORAL DAILY
Qty: 60 CAPSULE | Refills: 3 | Status: SHIPPED | OUTPATIENT
Start: 2023-04-07 | End: 2023-07-19

## 2023-04-07 NOTE — TELEPHONE ENCOUNTER
Routing refill request to provider for review/approval because:  Drug not on the [facility] refill protocol     Pending Prescriptions:                       Disp   Refills    doxycycline monohydrate (MONODOX) 50 MG ca*60 cap*3        Sig: Take 1 capsule (50 mg) by mouth daily      Requested Prescriptions   Pending Prescriptions Disp Refills    doxycycline monohydrate (MONODOX) 50 MG capsule 60 capsule 3     Sig: Take 1 capsule (50 mg) by mouth daily       There is no refill protocol information for this order        Evangelina Tilley RN on 4/7/2023 at 12:12 PM

## 2023-07-19 ENCOUNTER — OFFICE VISIT (OUTPATIENT)
Dept: DERMATOLOGY | Facility: CLINIC | Age: 78
End: 2023-07-19
Payer: COMMERCIAL

## 2023-07-19 DIAGNOSIS — L57.0 AK (ACTINIC KERATOSIS): ICD-10-CM

## 2023-07-19 DIAGNOSIS — Z85.828 HISTORY OF SKIN CANCER: Primary | ICD-10-CM

## 2023-07-19 DIAGNOSIS — L81.9 MINOCYCLINE PIGMENTATION: ICD-10-CM

## 2023-07-19 DIAGNOSIS — T36.4X5A MINOCYCLINE PIGMENTATION: ICD-10-CM

## 2023-07-19 DIAGNOSIS — D18.01 ANGIOMA OF SKIN: ICD-10-CM

## 2023-07-19 DIAGNOSIS — L82.1 SEBORRHEIC KERATOSIS: ICD-10-CM

## 2023-07-19 DIAGNOSIS — L81.4 LENTIGO: ICD-10-CM

## 2023-07-19 DIAGNOSIS — D23.9 DERMAL NEVUS: ICD-10-CM

## 2023-07-19 DIAGNOSIS — L71.9 ROSACEA: ICD-10-CM

## 2023-07-19 PROCEDURE — 17000 DESTRUCT PREMALG LESION: CPT | Performed by: DERMATOLOGY

## 2023-07-19 PROCEDURE — 17003 DESTRUCT PREMALG LES 2-14: CPT | Performed by: DERMATOLOGY

## 2023-07-19 PROCEDURE — 99213 OFFICE O/P EST LOW 20 MIN: CPT | Mod: 25 | Performed by: DERMATOLOGY

## 2023-07-19 RX ORDER — DOXYCYCLINE 50 MG/1
50 CAPSULE ORAL DAILY
Qty: 60 CAPSULE | Refills: 3 | Status: SHIPPED | OUTPATIENT
Start: 2023-07-19 | End: 2024-04-10

## 2023-07-19 ASSESSMENT — PAIN SCALES - GENERAL: PAINLEVEL: NO PAIN (0)

## 2023-07-19 NOTE — LETTER
7/19/2023         RE: Jean Hernandez  [id number] [address] [facility] [id number]        Dear Colleague,    Thank you for referring your patient, Jean Hernandez, to the [facility]. Please see a copy of my visit note below.    Jean Hernandez is an extremely pleasant 77 year old year old male patient here today for hx of non-melanoma skin cancer.  He notes some rough spot son face.  On doxy for rosacea.  Patient has no other skin complaints today.  Remainder of the HPI, Meds, PMH, Allergies, FH, and SH was reviewed in chart.    History reviewed. No pertinent past medical history.    No past surgical history on file.     Family History   Problem Relation Age of Onset     Melanoma No family hx of        Social History     Socioeconomic History     Marital status:      Spouse name: Not on file     Number of children: Not on file     Years of education: Not on file     Highest education level: Not on file   Occupational History     Not on file   Tobacco Use     Smoking status: Never     Smokeless tobacco: Never   Substance and Sexual Activity     Alcohol use: Not on file     Drug use: Not on file     Sexual activity: Not on file   Other Topics Concern     Not on file   Social History Narrative     Not on file     Social Determinants of Health     Financial Resource Strain: Not on file   Food Insecurity: Not on file   Transportation Needs: Not on file   Physical Activity: Not on file   Stress: Not on file   Social Connections: Not on file   Intimate Partner Violence: Not on file   Housing Stability: Not on file       Outpatient Encounter Medications as of 7/19/2023   Medication Sig Dispense Refill     doxycycline monohydrate (MONODOX) 50 MG capsule Take 1 capsule (50 mg) by mouth daily 60 capsule 3     brimonidine (ALPHAGAN) 0.2 % ophthalmic solution instill 1 drop IN EACH EYE TWICE DAILY  5     latanoprost (XALATAN) 0.005 % ophthalmic solution instill 1 drop into IN EACH EYE EVERY NIGHT AT  BEDTIME  5     timolol (TIMOPTIC) 0.5 % ophthalmic solution instill 1 drop IN EACH EYE TWICE DAILY  5     No facility-administered encounter medications on file as of 7/19/2023.             O:   NAD, WDWN, Alert & Oriented, Mood & Affect wnl, Vitals stable   Here today alone   General appearance normal   Vitals stable   Alert, oriented and in no acute distress      Following lymph nodes palpated: Occipital, Cervical, Supraclavicular no lad  Gritty scaly papule on face and scalp and L helix  MCN pigmentation on face     Stuck on papules and brown macules on trunk and ext   Red papules on trunk  Flesh colored papules on trunk     The remainder of the full exam was normal; the following areas were examined:  conjunctiva/lids, , neck, peripheral vascular system, abdomen, lymph nodes, digits/nails, eccrine and apocrine glands, scalp/hair, face, neck, chest, abdomen, buttocks, back, RUE, LUE, RLE, LLE       Eyes: Conjunctivae/lids:Normal     ENT: Lips, buccal mucosa, tongue: normal    MSK:Normal    Cardiovascular: peripheral edema none    Pulm: Breathing Normal    Lymph Nodes: No Head and Neck Lymphadenopathy     Neuro/Psych: Orientation:Alert and Orientedx3 ; Mood/Affect:normal       A/P:  1. Seborrheic keratosis, lentigo, angioma, dermal nevus, hx of non-melanoma skin cancer   2. Rosacea  Stable on doxy  3. Actinic keratosis   L scalp x1, L helix x1, R temple x3  LN2:  Treated with LN2 for 5s for 1-2 cycles. Warned risks of blistering, pain, pigment change, scarring, and incomplete resolution.  Advised patient to return if lesions do not completely resolve.  Wound care sheet given.  It was a pleasure speaking to Jean Hernandez today.  Previous clinic notes and pertinent laboratory tests were reviewed prior to Jean Hernandez's visit.  Signs and Symptoms of skin cancer discussed with patient.  Patient encouraged to perform monthly skin exams.  UV precautions reviewed with patient.  Risks of non-melanoma skin cancer  discussed with patient   Return to clinic 12 months      Again, thank you for allowing me to participate in the care of your patient.        Sincerely,        Balta Tucker MD

## 2023-07-19 NOTE — PROGRESS NOTES
Jean Hernandez is an extremely pleasant 77 year old year old male patient here today for hx of non-melanoma skin cancer.  He notes some rough spot son face.  On doxy for rosacea.  Patient has no other skin complaints today.  Remainder of the HPI, Meds, PMH, Allergies, FH, and SH was reviewed in chart.    History reviewed. No pertinent past medical history.    No past surgical history on file.     Family History   Problem Relation Age of Onset    Melanoma No family hx of        Social History     Socioeconomic History    Marital status:      Spouse name: Not on file    Number of children: Not on file    Years of education: Not on file    Highest education level: Not on file   Occupational History    Not on file   Tobacco Use    Smoking status: Never    Smokeless tobacco: Never   Substance and Sexual Activity    Alcohol use: Not on file    Drug use: Not on file    Sexual activity: Not on file   Other Topics Concern    Not on file   Social History Narrative    Not on file     Social Determinants of Health     Financial Resource Strain: Not on file   Food Insecurity: Not on file   Transportation Needs: Not on file   Physical Activity: Not on file   Stress: Not on file   Social Connections: Not on file   Intimate Partner Violence: Not on file   Housing Stability: Not on file       Outpatient Encounter Medications as of 7/19/2023   Medication Sig Dispense Refill    doxycycline monohydrate (MONODOX) 50 MG capsule Take 1 capsule (50 mg) by mouth daily 60 capsule 3    brimonidine (ALPHAGAN) 0.2 % ophthalmic solution instill 1 drop IN EACH EYE TWICE DAILY  5    latanoprost (XALATAN) 0.005 % ophthalmic solution instill 1 drop into IN EACH EYE EVERY NIGHT AT BEDTIME  5    timolol (TIMOPTIC) 0.5 % ophthalmic solution instill 1 drop IN EACH EYE TWICE DAILY  5     No facility-administered encounter medications on file as of 7/19/2023.             O:   NAD, WDWN, Alert & Oriented, Mood & Affect wnl, Vitals stable   Here today  alone   General appearance normal   Vitals stable   Alert, oriented and in no acute distress      Following lymph nodes palpated: Occipital, Cervical, Supraclavicular no lad  Gritty scaly papule on face and scalp and L helix  MCN pigmentation on face     Stuck on papules and brown macules on trunk and ext   Red papules on trunk  Flesh colored papules on trunk     The remainder of the full exam was normal; the following areas were examined:  conjunctiva/lids, , neck, peripheral vascular system, abdomen, lymph nodes, digits/nails, eccrine and apocrine glands, scalp/hair, face, neck, chest, abdomen, buttocks, back, RUE, LUE, RLE, LLE       Eyes: Conjunctivae/lids:Normal     ENT: Lips, buccal mucosa, tongue: normal    MSK:Normal    Cardiovascular: peripheral edema none    Pulm: Breathing Normal    Lymph Nodes: No Head and Neck Lymphadenopathy     Neuro/Psych: Orientation:Alert and Orientedx3 ; Mood/Affect:normal       A/P:  1. Seborrheic keratosis, lentigo, angioma, dermal nevus, hx of non-melanoma skin cancer   2. Rosacea  Stable on doxy  3. Actinic keratosis   L scalp x1, L helix x1, R temple x3  LN2:  Treated with LN2 for 5s for 1-2 cycles. Warned risks of blistering, pain, pigment change, scarring, and incomplete resolution.  Advised patient to return if lesions do not completely resolve.  Wound care sheet given.  It was a pleasure speaking to Jean Hernandez today.  Previous clinic notes and pertinent laboratory tests were reviewed prior to Jean Hernandez's visit.  Signs and Symptoms of skin cancer discussed with patient.  Patient encouraged to perform monthly skin exams.  UV precautions reviewed with patient.  Risks of non-melanoma skin cancer discussed with patient   Return to clinic 12 months

## 2024-04-10 DIAGNOSIS — L82.1 SEBORRHEIC KERATOSIS: ICD-10-CM

## 2024-04-10 DIAGNOSIS — D18.01 ANGIOMA OF SKIN: ICD-10-CM

## 2024-04-10 DIAGNOSIS — L81.4 LENTIGO: ICD-10-CM

## 2024-04-10 DIAGNOSIS — L81.9 MINOCYCLINE PIGMENTATION: ICD-10-CM

## 2024-04-10 DIAGNOSIS — L57.0 AK (ACTINIC KERATOSIS): ICD-10-CM

## 2024-04-10 DIAGNOSIS — D23.9 DERMAL NEVUS: ICD-10-CM

## 2024-04-10 DIAGNOSIS — L71.9 ROSACEA: ICD-10-CM

## 2024-04-10 DIAGNOSIS — T36.4X5A MINOCYCLINE PIGMENTATION: ICD-10-CM

## 2024-04-10 RX ORDER — DOXYCYCLINE 50 MG/1
50 CAPSULE ORAL DAILY
Qty: 90 CAPSULE | Refills: 1 | Status: SHIPPED | OUTPATIENT
Start: 2024-04-10

## 2024-04-10 NOTE — TELEPHONE ENCOUNTER
Requested Prescriptions   Pending Prescriptions Disp Refills    doxycycline monohydrate (MONODOX) 50 MG capsule 90 capsule 1     Sig: Take 1 capsule (50 mg) by mouth daily       There is no refill protocol information for this order         Medication not on nurse protocol. Sent to provider for review. Pt has follow up appt 7/2024.  Dominique Sánchez RN BSN PHN    LOV 7/2023  1. Seborrheic keratosis, lentigo, angioma, dermal nevus, hx of non-melanoma skin cancer   2. Rosacea  Stable on doxy  3. Actinic keratosis   L scalp x1, L helix x1, R temple x3  Return to clinic 12 months

## 2024-04-10 NOTE — TELEPHONE ENCOUNTER
[facility] Call Center    Phone Message    May a detailed message be left on voicemail: yes     Reason for Call: Medication Refill Request    Has the patient contacted the pharmacy for the refill? Yes   Name of medication being requested: doxycycline monohydrate (MONODOX) 50 MG capsule   Provider who prescribed the medication: Dr. Tucker  Pharmacy:   [address]     Date medication is needed: Now. Pt states pharmacy has requested multiple times.     Please call Pt back to confirm it's been sent. Thank you.        Action Taken: Other: [facility]    Travel Screening: Not Applicable

## 2024-07-30 ENCOUNTER — TELEPHONE (OUTPATIENT)
Dept: DERMATOLOGY | Facility: CLINIC | Age: 79
End: 2024-07-30

## 2024-07-30 ENCOUNTER — OFFICE VISIT (OUTPATIENT)
Dept: DERMATOLOGY | Facility: CLINIC | Age: 79
End: 2024-07-30
Payer: COMMERCIAL

## 2024-07-30 DIAGNOSIS — L57.0 AK (ACTINIC KERATOSIS): Primary | ICD-10-CM

## 2024-07-30 DIAGNOSIS — D23.9 DERMAL NEVUS: ICD-10-CM

## 2024-07-30 DIAGNOSIS — L81.4 LENTIGO: ICD-10-CM

## 2024-07-30 DIAGNOSIS — L82.1 SEBORRHEIC KERATOSES: ICD-10-CM

## 2024-07-30 DIAGNOSIS — D18.01 ANGIOMA OF SKIN: ICD-10-CM

## 2024-07-30 PROCEDURE — 99213 OFFICE O/P EST LOW 20 MIN: CPT | Mod: 25 | Performed by: DERMATOLOGY

## 2024-07-30 PROCEDURE — 17003 DESTRUCT PREMALG LES 2-14: CPT | Performed by: DERMATOLOGY

## 2024-07-30 PROCEDURE — 17000 DESTRUCT PREMALG LESION: CPT | Mod: 59 | Performed by: DERMATOLOGY

## 2024-07-30 PROCEDURE — 88331 PATH CONSLTJ SURG 1 BLK 1SPC: CPT | Performed by: DERMATOLOGY

## 2024-07-30 PROCEDURE — 11102 TANGNTL BX SKIN SINGLE LES: CPT | Performed by: DERMATOLOGY

## 2024-07-30 ASSESSMENT — PAIN SCALES - GENERAL: PAINLEVEL: NO PAIN (0)

## 2024-07-30 NOTE — LETTER
7/30/2024      Jean Hernandez  [address] [facility] [address]      Dear Colleague,    Thank you for referring your patient, Jean Hernandez, to the [facility]. Please see a copy of my visit note below.    Jean Hernandez is an extremely pleasant 78 year old year old male patient here today for rough spot on temple.  Patient has no other skin complaints today.  Remainder of the HPI, Meds, PMH, Allergies, FH, and SH was reviewed in chart.    No past medical history on file.    No past surgical history on file.     Family History   Problem Relation Age of Onset     Melanoma No family hx of        Social History     Socioeconomic History     Marital status:      Spouse name: Not on file     Number of children: Not on file     Years of education: Not on file     Highest education level: Not on file   Occupational History     Not on file   Tobacco Use     Smoking status: Never     Smokeless tobacco: Never   Substance and Sexual Activity     Alcohol use: Not on file     Drug use: Not on file     Sexual activity: Not on file   Other Topics Concern     Not on file   Social History Narrative     Not on file     Social Determinants of Health     Financial Resource Strain: Not on file   Food Insecurity: Not on file   Transportation Needs: Not on file   Physical Activity: Not on file   Stress: Not on file   Social Connections: Unknown (10/3/2022)    [facility] [employer] [facility] [employer] [facility]    Social Connections      Frequency of Communication with Friends and Family: Not on file   Interpersonal Safety: Not on file   Housing Stability: Not on file       Outpatient Encounter Medications as of 7/30/2024   Medication Sig Dispense Refill     brimonidine (ALPHAGAN) 0.2 % ophthalmic solution instill 1 drop IN EACH EYE TWICE DAILY  5     doxycycline monohydrate (MONODOX) 50 MG capsule Take 1 capsule (50 mg) by mouth daily 90 capsule 1      latanoprost (XALATAN) 0.005 % ophthalmic solution instill 1 drop into IN EACH EYE EVERY NIGHT AT BEDTIME  5     timolol (TIMOPTIC) 0.5 % ophthalmic solution instill 1 drop IN EACH EYE TWICE DAILY  5     No facility-administered encounter medications on file as of 7/30/2024.             O:   NAD, WDWN, Alert & Oriented, Mood & Affect wnl, Vitals stable   General appearance normal   Vitals stable   Alert, oriented and in no acute distress     L upper back ulcerated 1.1cm red scaly papule   L temple gritty scaly papule    R cheek gritty scaly papule   Stuck on papules and brown macules on trunk and ext   Red papules on trunk  Flesh colored papules on trunk         Eyes: Conjunctivae/lids:Normal     ENT: Lips, mucosa: normal    MSK:Normal    Cardiovascular: peripheral edema none    Pulm: Breathing Normal    Lymph Nodes: No Head and Neck Lymphadenopathy     Neuro/Psych: Orientation:Alert and Orientedx3 ; Mood/Affect:normal       MICRO:   L upper back:There is hyperkeratosis and focal parakeratosis of the epidermis, overlying atypical keratinocytes,  by areas of orthokeratosis, there are scattered basal atypical keratinocytes: with varying degrees of overlying loss of maturation, hyperchromatism, pleomorphism, increased and abnormal mitoses, dyskeratosis.  The dermis shows a variable inflammatory infiltrate.   A/P:  1. Seborrheic keratosis, lentigo, angioma, dermal nevus  2. L temple  and r cheek actinic keratosis   LN2:  Treated with LN2 for 5s for 1-2 cycles. Warned risks of blistering, pain, pigment change, scarring, and incomplete resolution.  Advised patient to return if lesions do not completely resolve.  Wound care sheet given.  3. Back r/o basal cell carcinoma   TANGENTIAL BIOPSY IN HOUSE:  After consent, anesthesia with LEC and prep, tangential excision performed and dx above confirmed with frozen section histology.  No complications and routine wound care.  Patient is not on  anticoagulants and risk of  bleeding discussed with patient.       I have personally reviewed all specimens and/or slides and used them with my medical judgement to determine or confirm the final diagnosis.     Patient told result actinic keratosis schedule cryo .      It was a pleasure speaking to Jean Hernandez today.  Previous clinic notes and pertinent laboratory tests were reviewed prior to Jean Hernandez's visit.  Signs and Symptoms of skin cancer discussed with patient.  Patient encouraged to perform monthly skin exams.  UV precautions reviewed with patient.  Risks of non-melanoma skin cancer discussed with patient   Return to clinic 6 months      Again, thank you for allowing me to participate in the care of your patient.        Sincerely,        Balta Tucker MD

## 2024-07-30 NOTE — TELEPHONE ENCOUNTER
----- Message from Balta Tucker sent at 7/30/2024 11:13 AM CDT -----  L upper back actinic keratosis schedule cryo

## 2024-07-30 NOTE — PROGRESS NOTES
Jean Hernandez is an extremely pleasant 78 year old year old male patient here today for rough spot on temple.  Patient has no other skin complaints today.  Remainder of the HPI, Meds, PMH, Allergies, FH, and SH was reviewed in chart.    No past medical history on file.    No past surgical history on file.     Family History   Problem Relation Age of Onset    Melanoma No family hx of        Social History     Socioeconomic History    Marital status:      Spouse name: Not on file    Number of children: Not on file    Years of education: Not on file    Highest education level: Not on file   Occupational History    Not on file   Tobacco Use    Smoking status: Never    Smokeless tobacco: Never   Substance and Sexual Activity    Alcohol use: Not on file    Drug use: Not on file    Sexual activity: Not on file   Other Topics Concern    Not on file   Social History Narrative    Not on file     Social Determinants of Health     Financial Resource Strain: Not on file   Food Insecurity: Not on file   Transportation Needs: Not on file   Physical Activity: Not on file   Stress: Not on file   Social Connections: Unknown (10/3/2022)    [facility]    Social Connections     Frequency of Communication with Friends and Family: Not on file   Interpersonal Safety: Not on file   Housing Stability: Not on file       Outpatient Encounter Medications as of 7/30/2024   Medication Sig Dispense Refill    brimonidine (ALPHAGAN) 0.2 % ophthalmic solution instill 1 drop IN EACH EYE TWICE DAILY  5    doxycycline monohydrate (MONODOX) 50 MG capsule Take 1 capsule (50 mg) by mouth daily 90 capsule 1    latanoprost (XALATAN) 0.005 % ophthalmic solution instill 1 drop into IN EACH EYE EVERY NIGHT AT BEDTIME  5    timolol (TIMOPTIC) 0.5 % ophthalmic solution instill 1 drop IN EACH EYE TWICE DAILY  5     No facility-administered encounter medications on  file as of 7/30/2024.             O:   NAD, WDWN, Alert & Oriented, Mood & Affect wnl, Vitals stable   General appearance normal   Vitals stable   Alert, oriented and in no acute distress     L upper back ulcerated 1.1cm red scaly papule   L temple gritty scaly papule    R cheek gritty scaly papule   Stuck on papules and brown macules on trunk and ext   Red papules on trunk  Flesh colored papules on trunk         Eyes: Conjunctivae/lids:Normal     ENT: Lips, mucosa: normal    MSK:Normal    Cardiovascular: peripheral edema none    Pulm: Breathing Normal    Lymph Nodes: No Head and Neck Lymphadenopathy     Neuro/Psych: Orientation:Alert and Orientedx3 ; Mood/Affect:normal       MICRO:   L upper back:There is hyperkeratosis and focal parakeratosis of the epidermis, overlying atypical keratinocytes,  by areas of orthokeratosis, there are scattered basal atypical keratinocytes: with varying degrees of overlying loss of maturation, hyperchromatism, pleomorphism, increased and abnormal mitoses, dyskeratosis.  The dermis shows a variable inflammatory infiltrate.   A/P:  1. Seborrheic keratosis, lentigo, angioma, dermal nevus  2. L temple  and r cheek actinic keratosis   LN2:  Treated with LN2 for 5s for 1-2 cycles. Warned risks of blistering, pain, pigment change, scarring, and incomplete resolution.  Advised patient to return if lesions do not completely resolve.  Wound care sheet given.  3. Back r/o basal cell carcinoma   TANGENTIAL BIOPSY IN HOUSE:  After consent, anesthesia with LEC and prep, tangential excision performed and dx above confirmed with frozen section histology.  No complications and routine wound care.  Patient is not on  anticoagulants and risk of bleeding discussed with patient.       I have personally reviewed all specimens and/or slides and used them with my medical judgement to determine or confirm the final diagnosis.     Patient told result actinic keratosis schedule cryo .      It was a  pleasure speaking to Jean Hernandez today.  Previous clinic notes and pertinent laboratory tests were reviewed prior to Jean Hernandez's visit.  Signs and Symptoms of skin cancer discussed with patient.  Patient encouraged to perform monthly skin exams.  UV precautions reviewed with patient.  Risks of non-melanoma skin cancer discussed with patient   Return to clinic 6 months

## 2024-07-30 NOTE — NURSING NOTE
Jean Hernandez's chief complaint for this visit includes:  Chief Complaint   Patient presents with    Skin Check     Patient would like a upper body skin check and has concerns regarding the following areas: back, right temple and eye.      PCP: No Ref-Primary, Physician    Referring Provider:  Referred Self, MD  No address on file    There were no vitals taken for this visit.  No Pain (0)      No Known Allergies      Do you need any medication refills at today's visit? No    Felisha Dennison MA

## 2024-07-30 NOTE — PATIENT INSTRUCTIONS
WOUND CARE INSTRUCTIONS   FOR CRYOSURGERY   This area treated with liquid nitrogen should form a blister (areas treated may or may not blister-skin may just turn dark and slough off). You do not need to bandage the area unless a blister forms and breaks (which may be a few days). When the blister breaks, begin daily dressing changes as follows:  1) Clean and dry the area with tap water using clean Q-tip or sterile gauze pad.   2) Apply Polysporin ointment or Bacitracin ointment over entire wound. Do NOT use Neosporin ointment.   3) Cover the wound with a band-aid or sterile non-stick gauze pad and micropore paper tape.   REPEAT THESE INSTRUCTIONS AT LEAST ONCE A DAY UNTIL THE WOUND HAS COMPLETELY HEALED.   It is an old wives tale that a wound heals better when it is exposed to air and allowed to dry out. The wound will heal faster with a better cosmetic result if it is kept moist with ointment and covered with a bandage.   Do not let the wound dry out.   IMPORTANT INFORMATION ON REVERSE SIDE   Supplies Needed:   *Cotton tipped applicators (Q-tips)   *Polysporin ointment or Bacitracin ointment (NOT NEOSPORIN)   *Band-aids, or non stick gauze pads and micropore paper tape   PATIENT INFORMATION   During the healing process you will notice a number of changes. All wounds develop a small halo of redness surrounding the wound. This means healing is occurring. Severe itching with extensive redness usually indicates sensitivity to the ointment or bandage tape used to dress the wound. You should call our office if this develops.   Swelling and/or discoloration around your surgical site is common, particularly when performed around the eye.   All wounds normally drain. The larger the wound the more drainage there will be. After 7-10 days, you will notice the wound beginning to shrink and new skin will begin to grow. The wound is healed when you can see skin has formed over the entire area. A healed wound has a healthy, shiny  look to the surface and is red to dark pink in color to normalize. Wounds may take approximately 4-6 weeks to heal. Larger wounds may take 6-8 weeks. After the wound is healed you may discontinue dressing changes.   You may experience a sensation of tightness as your wound heals. This is normal and will gradually subside.   Your healed wound may be sensitive to temperature changes. This sensitivity improves with time, but if you re having a lot of discomfort, try to avoid temperature extremes.   Patients frequently experience itching after their wound appears to have healed because of the continue healing under the skin. Plain Vaseline will help relieve the itching.            Wound Care Instructions     FOR SUPERFICIAL WOUNDS     [facility] [phone]    [facility] [phone]                       AFTER 24 HOURS YOU SHOULD REMOVE THE BANDAGE AND BEGIN DAILY DRESSING CHANGES AS FOLLOWS:     1) Remove Dressing.     2) Clean and dry the area with tap water using a Q-tip or sterile gauze pad.     3) Apply Vaseline, Aquaphor, Polysporin ointment or Bacitracin ointment over entire wound.  Do NOT use Neosporin ointment.     4) Cover the wound with a band-aid, or a sterile non-stick gauze pad and micropore paper tape      REPEAT THESE INSTRUCTIONS AT LEAST ONCE A DAY UNTIL THE WOUND HAS COMPLETELY HEALED.    It is an old wives tale that a wound heals better when it is exposed to air and allowed to dry out. The wound will heal faster with a better cosmetic result if it is kept moist with ointment and covered with a bandage.    **Do not let the wound dry out.**      Supplies Needed:      *Cotton tipped applicators (Q-tips)    *Polysporin Ointment or Bacitracin Ointment (NOT NEOSPORIN)    *Band-aids or non-stick gauze pads and micropore paper tape.      PATIENT INFORMATION:    During the healing process you will notice a number of changes. All wounds develop a small halo of redness surrounding  the wound.  This means healing is occurring. Severe itching with extensive redness usually indicates sensitivity to the ointment or bandage tape used to dress the wound.  You should call our office if this develops.      Swelling  and/or discoloration around your surgical site is common, particularly when performed around the eye.    All wounds normally drain.  The larger the wound the more drainage there will be.  After 7-10 days, you will notice the wound beginning to shrink and new skin will begin to grow.  The wound is healed when you can see skin has formed over the entire area.  A healed wound has a healthy, shiny look to the surface and is red to dark pink in color to normalize.  Wounds may take approximately 4-6 weeks to heal.  Larger wounds may take 6-8 weeks.  After the wound is healed you may discontinue dressing changes.    You may experience a sensation of tightness as your wound heals. This is normal and will gradually subside.    Your healed wound may be sensitive to temperature changes. This sensitivity improves with time, but if you re having a lot of discomfort, try to avoid temperature extremes.    Patients frequently experience itching after their wound appears to have healed because of the continue healing under the skin.  Plain Vaseline will help relieve the itching.        POSSIBLE COMPLICATIONS    BLEEDING:    Leave the bandage in place.  Use tightly rolled up gauze or a cloth to apply direct pressure over the bandage for 30  minutes.  Reapply pressure for an additional 30 minutes if necessary  Use additional gauze and tape to maintain pressure once the bleeding has stopped.

## 2024-08-06 ENCOUNTER — OFFICE VISIT (OUTPATIENT)
Dept: DERMATOLOGY | Facility: CLINIC | Age: 79
End: 2024-08-06
Payer: COMMERCIAL

## 2024-08-06 DIAGNOSIS — L57.0 AK (ACTINIC KERATOSIS): Primary | ICD-10-CM

## 2024-08-06 PROCEDURE — 17000 DESTRUCT PREMALG LESION: CPT | Mod: 79 | Performed by: DERMATOLOGY

## 2024-08-06 NOTE — LETTER
8/6/2024      Jean Hernandez  [address] [address] [address] [address]      Dear Colleague,    Thank you for referring your patient, Jean Hernandez, to the [facility]. Please see a copy of my visit note below.    Jean Hernandez is an extremely pleasant 78 year old year old male patient here today for evaluation and managment of actinic keratosis on back.  Patient has no other skin complaints today.  Remainder of the HPI, Meds, PMH, Allergies, FH, and SH was reviewed in chart.    No past medical history on file.    No past surgical history on file.     Family History   Problem Relation Age of Onset     Melanoma No family hx of        Social History     Socioeconomic History     Marital status:      Spouse name: Not on file     Number of children: Not on file     Years of education: Not on file     Highest education level: Not on file   Occupational History     Not on file   Tobacco Use     Smoking status: Never     Smokeless tobacco: Never   Substance and Sexual Activity     Alcohol use: Not on file     Drug use: Not on file     Sexual activity: Not on file   Other Topics Concern     Not on file   Social History Narrative     Not on file     Social Determinants of Health     Financial Resource Strain: Not on file   Food Insecurity: Not on file   Transportation Needs: Not on file   Physical Activity: Not on file   Stress: Not on file   Social Connections: Unknown (10/3/2022)    [facility], [facility]    Social Connections      Frequency of Communication with Friends and Family: Not on file   Interpersonal Safety: Not on file   Housing Stability: Not on file       Outpatient Encounter Medications as of 8/6/2024   Medication Sig Dispense Refill     brimonidine (ALPHAGAN) 0.2 % ophthalmic solution instill 1 drop IN EACH EYE TWICE DAILY  5     doxycycline monohydrate (MONODOX) 50 MG capsule Take 1 capsule (50 mg) by  mouth daily 90 capsule 1     latanoprost (XALATAN) 0.005 % ophthalmic solution instill 1 drop into IN EACH EYE EVERY NIGHT AT BEDTIME  5     timolol (TIMOPTIC) 0.5 % ophthalmic solution instill 1 drop IN EACH EYE TWICE DAILY  5     No facility-administered encounter medications on file as of 8/6/2024.             O:   NAD, WDWN, Alert & Oriented, Mood & Affect wnl, Vitals stable   General appearance normal   Vitals stable   Alert, oriented and in no acute distress     L upper back gritty plaque      Eyes: Conjunctivae/lids:Normal     ENT: Lips, mucosa: normal    MSK:Normal    Cardiovascular: peripheral edema none    Pulm: Breathing Normal    Neuro/Psych: Orientation:Alert and Orientedx3 ; Mood/Affect:normal       A/P:  Actinic keratosis   Pathophysiology discussed with pateint   LN2:  Treated with LN2 for 5s for 1-2 cycles. Warned risks of blistering, pain, pigment change, scarring, and incomplete resolution.  Advised patient to return if lesions do not completely resolve.  Wound care sheet given.  It was a pleasure speaking to Jean Hernandez today.  Previous clinic notes and pertinent laboratory tests were reviewed prior to Jean Hernandez's visit.  Signs and Symptoms of skin cancer discussed with patient.  Patient encouraged to perform monthly skin exams.  UV precautions reviewed with patient.  Return to clinic 12 months      Again, thank you for allowing me to participate in the care of your patient.        Sincerely,        Balta Tucker MD

## 2024-08-06 NOTE — PROGRESS NOTES
Jean Hernandez is an extremely pleasant 78 year old year old male patient here today for evaluation and managment of actinic keratosis on back.  Patient has no other skin complaints today.  Remainder of the HPI, Meds, PMH, Allergies, FH, and SH was reviewed in chart.    No past medical history on file.    No past surgical history on file.     Family History   Problem Relation Age of Onset    Melanoma No family hx of        Social History     Socioeconomic History    Marital status:      Spouse name: Not on file    Number of children: Not on file    Years of education: Not on file    Highest education level: Not on file   Occupational History    Not on file   Tobacco Use    Smoking status: Never    Smokeless tobacco: Never   Substance and Sexual Activity    Alcohol use: Not on file    Drug use: Not on file    Sexual activity: Not on file   Other Topics Concern    Not on file   Social History Narrative    Not on file     Social Determinants of Health     Financial Resource Strain: Not on file   Food Insecurity: Not on file   Transportation Needs: Not on file   Physical Activity: Not on file   Stress: Not on file   Social Connections: Unknown (10/3/2022)    [facility], [facility]    Social Connections     Frequency of Communication with Friends and Family: Not on file   Interpersonal Safety: Not on file   Housing Stability: Not on file       Outpatient Encounter Medications as of 8/6/2024   Medication Sig Dispense Refill    brimonidine (ALPHAGAN) 0.2 % ophthalmic solution instill 1 drop IN EACH EYE TWICE DAILY  5    doxycycline monohydrate (MONODOX) 50 MG capsule Take 1 capsule (50 mg) by mouth daily 90 capsule 1    latanoprost (XALATAN) 0.005 % ophthalmic solution instill 1 drop into IN EACH EYE EVERY NIGHT AT BEDTIME  5    timolol (TIMOPTIC) 0.5 % ophthalmic solution instill 1 drop IN EACH EYE TWICE DAILY  5     No  facility-administered encounter medications on file as of 8/6/2024.             O:   NAD, WDWN, Alert & Oriented, Mood & Affect wnl, Vitals stable   General appearance normal   Vitals stable   Alert, oriented and in no acute distress     L upper back gritty plaque      Eyes: Conjunctivae/lids:Normal     ENT: Lips, mucosa: normal    MSK:Normal    Cardiovascular: peripheral edema none    Pulm: Breathing Normal    Neuro/Psych: Orientation:Alert and Orientedx3 ; Mood/Affect:normal       A/P:  Actinic keratosis   Pathophysiology discussed with pateint   LN2:  Treated with LN2 for 5s for 1-2 cycles. Warned risks of blistering, pain, pigment change, scarring, and incomplete resolution.  Advised patient to return if lesions do not completely resolve.  Wound care sheet given.  It was a pleasure speaking to Jean Hernandez today.  Previous clinic notes and pertinent laboratory tests were reviewed prior to Jean Hernandez's visit.  Signs and Symptoms of skin cancer discussed with patient.  Patient encouraged to perform monthly skin exams.  UV precautions reviewed with patient.  Return to clinic 12 months

## 2024-11-01 DIAGNOSIS — T36.4X5A MINOCYCLINE PIGMENTATION: ICD-10-CM

## 2024-11-01 DIAGNOSIS — L71.9 ROSACEA: ICD-10-CM

## 2024-11-01 DIAGNOSIS — D23.9 DERMAL NEVUS: ICD-10-CM

## 2024-11-01 DIAGNOSIS — L82.1 SEBORRHEIC KERATOSIS: ICD-10-CM

## 2024-11-01 DIAGNOSIS — D18.01 ANGIOMA OF SKIN: ICD-10-CM

## 2024-11-01 DIAGNOSIS — L81.4 LENTIGO: ICD-10-CM

## 2024-11-01 DIAGNOSIS — L57.0 AK (ACTINIC KERATOSIS): ICD-10-CM

## 2024-11-01 DIAGNOSIS — L81.9 MINOCYCLINE PIGMENTATION: ICD-10-CM

## 2024-11-04 RX ORDER — DOXYCYCLINE 50 MG/1
50 CAPSULE ORAL DAILY
Qty: 90 CAPSULE | Refills: 1 | Status: SHIPPED | OUTPATIENT
Start: 2024-11-04

## 2025-05-12 DIAGNOSIS — L82.1 SEBORRHEIC KERATOSIS: ICD-10-CM

## 2025-05-12 DIAGNOSIS — L81.4 LENTIGO: ICD-10-CM

## 2025-05-12 DIAGNOSIS — T36.4X5A MINOCYCLINE PIGMENTATION: ICD-10-CM

## 2025-05-12 DIAGNOSIS — D23.9 DERMAL NEVUS: ICD-10-CM

## 2025-05-12 DIAGNOSIS — L71.9 ROSACEA: ICD-10-CM

## 2025-05-12 DIAGNOSIS — L81.9 MINOCYCLINE PIGMENTATION: ICD-10-CM

## 2025-05-12 DIAGNOSIS — D18.01 ANGIOMA OF SKIN: ICD-10-CM

## 2025-05-12 DIAGNOSIS — L57.0 AK (ACTINIC KERATOSIS): ICD-10-CM

## 2025-05-19 RX ORDER — DOXYCYCLINE 50 MG/1
50 CAPSULE ORAL DAILY
Qty: 90 CAPSULE | Refills: 1 | Status: SHIPPED | OUTPATIENT
Start: 2025-05-19